# Patient Record
Sex: MALE | Race: WHITE | Employment: OTHER | URBAN - METROPOLITAN AREA
[De-identification: names, ages, dates, MRNs, and addresses within clinical notes are randomized per-mention and may not be internally consistent; named-entity substitution may affect disease eponyms.]

---

## 2020-02-06 ENCOUNTER — HOSPITAL ENCOUNTER (OUTPATIENT)
Dept: ULTRASOUND IMAGING | Age: 85
Discharge: HOME | End: 2020-02-06
Payer: MEDICARE

## 2020-02-06 PROCEDURE — 76870 US EXAM SCROTUM: CPT

## 2021-12-15 ENCOUNTER — APPOINTMENT (OUTPATIENT)
Dept: CT IMAGING | Age: 86
End: 2021-12-15
Payer: MEDICARE

## 2021-12-15 ENCOUNTER — HOSPITAL ENCOUNTER (EMERGENCY)
Age: 86
Discharge: HOME | End: 2021-12-15
Attending: EMERGENCY MEDICINE
Payer: MEDICARE

## 2021-12-15 VITALS
TEMPERATURE: 97.9 F | HEART RATE: 91 BPM | HEIGHT: 66 IN | DIASTOLIC BLOOD PRESSURE: 72 MMHG | BODY MASS INDEX: 30.53 KG/M2 | SYSTOLIC BLOOD PRESSURE: 150 MMHG | WEIGHT: 190 LBS | RESPIRATION RATE: 20 BRPM | OXYGEN SATURATION: 97 %

## 2021-12-15 DIAGNOSIS — N43.3 HYDROCELE, BILATERAL: ICD-10-CM

## 2021-12-15 DIAGNOSIS — N32.3 BLADDER DIVERTICULUM: ICD-10-CM

## 2021-12-15 DIAGNOSIS — K44.9 HIATAL HERNIA: ICD-10-CM

## 2021-12-15 DIAGNOSIS — K40.90 UNILATERAL INGUINAL HERNIA WITHOUT OBSTRUCTION OR GANGRENE, RECURRENCE NOT SPECIFIED: Primary | ICD-10-CM

## 2021-12-15 DIAGNOSIS — K57.90 DIVERTICULOSIS: ICD-10-CM

## 2021-12-15 LAB
ALBUMIN SERPL-MCNC: 4.1 G/DL (ref 3.5–5.2)
ALP BLD-CCNC: 72 U/L (ref 40–129)
ALT SERPL-CCNC: 11 U/L (ref 0–40)
ANION GAP SERPL CALCULATED.3IONS-SCNC: 11 MMOL/L (ref 7–16)
AST SERPL-CCNC: 15 U/L (ref 0–39)
BASOPHILS ABSOLUTE: 0.08 E9/L (ref 0–0.2)
BASOPHILS RELATIVE PERCENT: 1.4 % (ref 0–2)
BILIRUB SERPL-MCNC: 0.8 MG/DL (ref 0–1.2)
BILIRUBIN URINE: NEGATIVE
BLOOD, URINE: NEGATIVE
BUN BLDV-MCNC: 13 MG/DL (ref 6–23)
CALCIUM SERPL-MCNC: 9.1 MG/DL (ref 8.6–10.2)
CHLORIDE BLD-SCNC: 106 MMOL/L (ref 98–107)
CLARITY: CLEAR
CO2: 25 MMOL/L (ref 22–29)
COLOR: YELLOW
CREAT SERPL-MCNC: 1.1 MG/DL (ref 0.7–1.2)
EOSINOPHILS ABSOLUTE: 0.16 E9/L (ref 0.05–0.5)
EOSINOPHILS RELATIVE PERCENT: 2.7 % (ref 0–6)
GFR AFRICAN AMERICAN: >60
GFR NON-AFRICAN AMERICAN: >60 ML/MIN/1.73
GLUCOSE BLD-MCNC: 102 MG/DL (ref 74–99)
GLUCOSE URINE: NEGATIVE MG/DL
HCT VFR BLD CALC: 40.1 % (ref 37–54)
HEMOGLOBIN: 13.3 G/DL (ref 12.5–16.5)
IMMATURE GRANULOCYTES #: 0.04 E9/L
IMMATURE GRANULOCYTES %: 0.7 % (ref 0–5)
KETONES, URINE: NEGATIVE MG/DL
LACTIC ACID, SEPSIS: 0.8 MMOL/L (ref 0.5–1.9)
LEUKOCYTE ESTERASE, URINE: NEGATIVE
LYMPHOCYTES ABSOLUTE: 1.41 E9/L (ref 1.5–4)
LYMPHOCYTES RELATIVE PERCENT: 24 % (ref 20–42)
MAGNESIUM: 2 MG/DL (ref 1.6–2.6)
MCH RBC QN AUTO: 31.8 PG (ref 26–35)
MCHC RBC AUTO-ENTMCNC: 33.2 % (ref 32–34.5)
MCV RBC AUTO: 95.9 FL (ref 80–99.9)
MONOCYTES ABSOLUTE: 0.56 E9/L (ref 0.1–0.95)
MONOCYTES RELATIVE PERCENT: 9.5 % (ref 2–12)
NEUTROPHILS ABSOLUTE: 3.63 E9/L (ref 1.8–7.3)
NEUTROPHILS RELATIVE PERCENT: 61.7 % (ref 43–80)
NITRITE, URINE: NEGATIVE
PDW BLD-RTO: 12.4 FL (ref 11.5–15)
PH UA: 7 (ref 5–9)
PLATELET # BLD: 219 E9/L (ref 130–450)
PMV BLD AUTO: 9.6 FL (ref 7–12)
POTASSIUM SERPL-SCNC: 4.8 MMOL/L (ref 3.5–5)
PROTEIN UA: NEGATIVE MG/DL
RBC # BLD: 4.18 E12/L (ref 3.8–5.8)
SODIUM BLD-SCNC: 142 MMOL/L (ref 132–146)
SPECIFIC GRAVITY UA: 1.02 (ref 1–1.03)
TOTAL PROTEIN: 6.7 G/DL (ref 6.4–8.3)
UROBILINOGEN, URINE: 0.2 E.U./DL
WBC # BLD: 5.9 E9/L (ref 4.5–11.5)

## 2021-12-15 PROCEDURE — 81003 URINALYSIS AUTO W/O SCOPE: CPT

## 2021-12-15 PROCEDURE — 83735 ASSAY OF MAGNESIUM: CPT

## 2021-12-15 PROCEDURE — 85025 COMPLETE CBC W/AUTO DIFF WBC: CPT

## 2021-12-15 PROCEDURE — 6360000004 HC RX CONTRAST MEDICATION: Performed by: RADIOLOGY

## 2021-12-15 PROCEDURE — 80053 COMPREHEN METABOLIC PANEL: CPT

## 2021-12-15 PROCEDURE — 6370000000 HC RX 637 (ALT 250 FOR IP): Performed by: STUDENT IN AN ORGANIZED HEALTH CARE EDUCATION/TRAINING PROGRAM

## 2021-12-15 PROCEDURE — 74177 CT ABD & PELVIS W/CONTRAST: CPT

## 2021-12-15 PROCEDURE — 83605 ASSAY OF LACTIC ACID: CPT

## 2021-12-15 PROCEDURE — 99284 EMERGENCY DEPT VISIT MOD MDM: CPT

## 2021-12-15 RX ORDER — HYDROCODONE BITARTRATE AND ACETAMINOPHEN 5; 325 MG/1; MG/1
1 TABLET ORAL EVERY 8 HOURS PRN
Qty: 9 TABLET | Refills: 0 | Status: SHIPPED | OUTPATIENT
Start: 2021-12-15 | End: 2021-12-18

## 2021-12-15 RX ORDER — MAGNESIUM SULFATE IN WATER 40 MG/ML
2000 INJECTION, SOLUTION INTRAVENOUS ONCE
Status: DISCONTINUED | OUTPATIENT
Start: 2021-12-15 | End: 2021-12-15

## 2021-12-15 RX ORDER — M-VIT,TX,IRON,MINS/CALC/FOLIC 27MG-0.4MG
1 TABLET ORAL DAILY
COMMUNITY

## 2021-12-15 RX ORDER — ASPIRIN 81 MG/1
81 TABLET, CHEWABLE ORAL DAILY
COMMUNITY
End: 2022-04-11 | Stop reason: SINTOL

## 2021-12-15 RX ORDER — HYDROCODONE BITARTRATE AND ACETAMINOPHEN 5; 325 MG/1; MG/1
1 TABLET ORAL ONCE
Status: COMPLETED | OUTPATIENT
Start: 2021-12-15 | End: 2021-12-15

## 2021-12-15 RX ORDER — MORPHINE SULFATE 4 MG/ML
4 INJECTION, SOLUTION INTRAMUSCULAR; INTRAVENOUS ONCE
Status: DISCONTINUED | OUTPATIENT
Start: 2021-12-15 | End: 2021-12-15

## 2021-12-15 RX ORDER — TAMSULOSIN HYDROCHLORIDE 0.4 MG/1
0.4 CAPSULE ORAL NIGHTLY
COMMUNITY
End: 2022-06-15

## 2021-12-15 RX ADMIN — IOPAMIDOL 75 ML: 755 INJECTION, SOLUTION INTRAVENOUS at 15:38

## 2021-12-15 RX ADMIN — HYDROCODONE BITARTRATE AND ACETAMINOPHEN 1 TABLET: 5; 325 TABLET ORAL at 16:53

## 2021-12-15 ASSESSMENT — ENCOUNTER SYMPTOMS
SHORTNESS OF BREATH: 0
BACK PAIN: 0
SORE THROAT: 0
ABDOMINAL PAIN: 1
DIARRHEA: 0
VOMITING: 0
BLOOD IN STOOL: 0
ABDOMINAL DISTENTION: 1
RHINORRHEA: 0
NAUSEA: 0
COUGH: 0

## 2021-12-15 ASSESSMENT — PAIN SCALES - GENERAL: PAINLEVEL_OUTOF10: 5

## 2021-12-15 ASSESSMENT — PAIN DESCRIPTION - LOCATION: LOCATION: ABDOMEN;GROIN

## 2021-12-15 NOTE — ED PROVIDER NOTES
[address] [facility]  Department of Emergency Medicine     Written by: Iesha Patricia DO  Patient Name: Romana Ficks  Attending Provider: Ava Flanagan MD  Admit Date: 12/15/2021 11:36 AM  MRN: [id number]                   : 1935        Chief Complaint   Patient presents with    Abdominal Pain     scrotal swelling-onset 2 weeks-denies urinary discomfort    - Chief complaint    HPI   Romana Ficks is a 81 y.o. male presenting to the ED for evaluation of Abdominal Pain (scrotal swelling-onset 2 weeks-denies urinary discomfort)    Patient is being evaluated for left lower quadrant/left lower groin pain and swelling in this region as well as swelling of his left testicle; patient's complaints are severe in severity, waxing and waning, improved after laying flat overnight and sleeping, and start to gradually worsen throughout the day as he gets moving. Patient denies any history of hernia. He does endorse a history of BPH and states that he used to follow with urology for this but has not had any issues in a long time; he currently denies any urinary problems whatsoever, denies any dysuria, frequency, difficulty with urination, or hematuria. Patient denies any nausea or vomiting, he has not had any diarrhea, black or bloody stools. Patient has not had any recent illnesses, fevers, abnormal rashes anywhere, cough, chest pain or palpitations or shortness of breath. Review of Systems   Constitutional: Negative for chills and fever. HENT: Negative for rhinorrhea and sore throat. Eyes: Negative for visual disturbance. Respiratory: Negative for cough and shortness of breath. Cardiovascular: Negative for chest pain and palpitations. Gastrointestinal: Positive for abdominal distention (LLQ swelling) and abdominal pain (LLQ). Negative for blood in stool, diarrhea, nausea and vomiting. Endocrine: Negative for polydipsia and polyuria.    Genitourinary: Positive for scrotal swelling (left) and testicular pain (left). Negative for decreased urine volume, difficulty urinating, dysuria, flank pain, frequency, hematuria, penile discharge, penile pain and penile swelling. Musculoskeletal: Negative for back pain and myalgias. Skin: Negative for rash and wound. Neurological: Negative for weakness and headaches. Psychiatric/Behavioral: Negative for confusion. All other systems reviewed and are negative. Physical Exam  Vitals and nursing note reviewed. Constitutional:       General: He is not in acute distress. Appearance: He is not toxic-appearing. HENT:      Head: Normocephalic and atraumatic. Right Ear: External ear normal.      Left Ear: External ear normal.      Nose: Nose normal. No rhinorrhea. Mouth/Throat:      Mouth: Mucous membranes are moist.      Pharynx: Oropharynx is clear. Eyes:      Extraocular Movements: Extraocular movements intact. Conjunctiva/sclera: Conjunctivae normal.      Pupils: Pupils are equal, round, and reactive to light. Cardiovascular:      Rate and Rhythm: Normal rate and regular rhythm. Pulses: Normal pulses. Heart sounds: Normal heart sounds. Pulmonary:      Effort: Pulmonary effort is normal. No respiratory distress. Breath sounds: Normal breath sounds. No wheezing or rales. Abdominal:      General: Bowel sounds are normal. There is no distension. Palpations: Abdomen is soft. Tenderness: There is abdominal tenderness (swelling and tenderness in left groin region). There is no right CVA tenderness, left CVA tenderness, guarding or rebound. Hernia: A hernia is present. Hernia is present in the left inguinal area.       Comments: No signs or exam findings to suggest Caroline's or infectious process   Genitourinary:     Penis: Normal.       Comments: Left testicle appears swollen; no redness; normal cremasteric reflex; no crepitus, no wound or rash noted; moderate TTP to this left testicle; subtle bowel sounds heard when auscultating left testicular region and left groin  Musculoskeletal:         General: No tenderness. Normal range of motion. Cervical back: Normal range of motion and neck supple. Right lower leg: No edema. Left lower leg: No edema. Skin:     General: Skin is warm and dry. Capillary Refill: Capillary refill takes less than 2 seconds. Coloration: Skin is not jaundiced or pale. Findings: No rash. Neurological:      General: No focal deficit present. Mental Status: He is alert and oriented to person, place, and time. Psychiatric:         Mood and Affect: Mood normal.         Behavior: Behavior normal.          Procedures       [facility]     ED Course as of 12/15/21 1644   Wed Dec 15, 2021   1618 CT ABDOMEN PELVIS W IV CONTRAST Additional Contrast? None  IMPRESSION:  1. Left inguinal hernia contains fat and sigmoid colon without strangulation  or obstruction. 2. Bilateral hydroceles within the scrotum. 3. Colonic diverticulosis without evidence of diverticulitis. 4. Left nonobstructive nephrolithiasis. 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 6. Large hiatal hernia. 7. 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 [VG]   1643 Patient does not want any IV morphine but is amenable to a dose of p.o. Norco.  Orders placed. He has to use the bathroom but I did let him know that we are going to attempt to reduce his hernia, he voiced understanding and is amenable to this plan. We will attempt reduction after he is finished using the restroom. [VG]      ED Course User Index  [VG] Prince Crisostomo DO       [facility]    This is a 81 y.o. male presenting for evaluation of left lower groin pain, swelling, and swelling/pain in his left testicle; denies history of hernia. On arrival patient is alert, oriented, appears mildly uncomfortable due to symptoms; he is not toxic in appearance.   Vitals are stable. Exam significant for swelling/distention of his left lower quadrant and left testicle; there is no significant redness, no palpable crepitus, no wounds and no signs or exam findings that would be concerning for cellulitis or Caroline's; there is no penile pain or penile discharge on exam; normal cremasteric reflex. Suspect inguinal hernia on examination. CT abdomen pelvis performed which shows left inguinal hernia which contains fat and sigmoid colon without strangulation or obstruction; this correlates with lab values which showed normal lactic, no leukocytosis, and overall generally unremarkable lab values; CT also shows incidental findings which were discussed with patient. Patient was given p.o. Norco with improvement of symptoms, placed in Trendelenburg position and ice packs were applied to the affected region; slow attempt at reduction was made with success although after pressure is removed it does appear that the hernia comes back out but does not come back down to the testicle. Patient states his symptoms are improved. Given his improvement of symptoms, reassuring exam findings, reassuring labs and imaging studies, feel patient is appropriate for discharge with instruction for outpatient follow-up. He was referred to general surgery and was also referred to follow-up with urology given the finding of bilateral hydrocele on his CT study. Patient voiced understanding is amenable to this plan. Strict return precautions were discussed. He was provided short prescription of Garrett for symptoms as needed. I have discussed this patient with my attending, who has seen the patient and agrees with this disposition.     Patient was seen and evaluated by myself and my attending Micha Keller MD. Assessment and Plan discussed with attending provider, please see attestation for final plan of care.       --------------------------------------------- PAST HISTORY ---------------------------------------------  Past Medical History:  has a past medical history of BPH (benign prostatic hyperplasia). Past Surgical History:  has no past surgical history on file. Social History:  reports that he has never smoked. He does not have any smokeless tobacco history on file. He reports current alcohol use. Family History: family history is not on file. The patients home medications have been reviewed. Allergies: Patient has no known allergies.     -------------------------------------------------- RESULTS -------------------------------------------------  Labs:  Results for orders placed or performed during the hospital encounter of 12/15/21   CBC Auto Differential   Result Value Ref Range    WBC 5.9 4.5 - 11.5 E9/L    RBC 4.18 3.80 - 5.80 E12/L    Hemoglobin 13.3 12.5 - 16.5 g/dL    Hematocrit 40.1 37.0 - 54.0 %    MCV 95.9 80.0 - 99.9 fL    MCH 31.8 26.0 - 35.0 pg    MCHC 33.2 32.0 - 34.5 %    RDW 12.4 11.5 - 15.0 fL    Platelets 376 154 - 219 E9/L    MPV 9.6 7.0 - 12.0 fL    Neutrophils % 61.7 43.0 - 80.0 %    Immature Granulocytes % 0.7 0.0 - 5.0 %    Lymphocytes % 24.0 20.0 - 42.0 %    Monocytes % 9.5 2.0 - 12.0 %    Eosinophils % 2.7 0.0 - 6.0 %    Basophils % 1.4 0.0 - 2.0 %    Neutrophils Absolute 3.63 1.80 - 7.30 E9/L    Immature Granulocytes # 0.04 E9/L    Lymphocytes Absolute 1.41 (L) 1.50 - 4.00 E9/L    Monocytes Absolute 0.56 0.10 - 0.95 E9/L    Eosinophils Absolute 0.16 0.05 - 0.50 E9/L    Basophils Absolute 0.08 0.00 - 0.20 E9/L   Comprehensive metabolic panel   Result Value Ref Range    Sodium 142 132 - 146 mmol/L    Potassium 4.8 3.5 - 5.0 mmol/L    Chloride 106 98 - 107 mmol/L    CO2 25 22 - 29 mmol/L    Anion Gap 11 7 - 16 mmol/L    Glucose 102 (H) 74 - 99 mg/dL    BUN 13 6 - 23 mg/dL    CREATININE 1.1 0.7 - 1.2 mg/dL    GFR Non-African American >60 >=60 mL/min/1.73    GFR African American >60     Calcium 9.1 8.6 - 10.2 mg/dL    Total Protein 6.7 6.4 - 8.3 g/dL    Albumin 4.1 3.5 - 5.2 g/dL    Total Bilirubin 0.8 0.0 - 1.2 mg/dL    Alkaline Phosphatase 72 40 - 129 U/L    ALT 11 0 - 40 U/L    AST 15 0 - 39 U/L   Urinalysis, reflex to microscopic   Result Value Ref Range    Color, UA Yellow Straw/Yellow    Clarity, UA Clear Clear    Glucose, Ur Negative Negative mg/dL    Bilirubin Urine Negative Negative    Ketones, Urine Negative Negative mg/dL    Specific Gravity, UA 1.020 1.005 - 1.030    Blood, Urine Negative Negative    pH, UA 7.0 5.0 - 9.0    Protein, UA Negative Negative mg/dL    Urobilinogen, Urine 0.2 <2.0 E.U./dL    Nitrite, Urine Negative Negative    Leukocyte Esterase, Urine Negative Negative   Lactate, Sepsis   Result Value Ref Range    Lactic Acid, Sepsis 0.8 0.5 - 1.9 mmol/L   Magnesium   Result Value Ref Range    Magnesium 2.0 1.6 - 2.6 mg/dL       Radiology:  CT ABDOMEN PELVIS W IV CONTRAST Additional Contrast? None   Final Result   1. Left inguinal hernia contains fat and sigmoid colon without strangulation   or obstruction. 2. Bilateral hydroceles within the scrotum. 3. Colonic diverticulosis without evidence of diverticulitis. 4. Left nonobstructive nephrolithiasis. 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 6. Large hiatal hernia. 7. 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 RECOMMENDATIONS:   Unavailable                   ------------------------- NURSING NOTES AND VITALS REVIEWED ---------------------------  Date / Time Roomed:  12/15/2021 11:36 AM  ED Bed Assignment:  20/20    The nursing notes within the ED encounter and vital signs as below have been reviewed.    BP (!) 143/68   Pulse 84   Temp 97.5 °F (36.4 °C) (Temporal)   Resp 18   Ht 5' 6\" (1.676 m)   Wt 190 lb (86.2 kg)   SpO2 98%   BMI 30.67 kg/m²   Oxygen Saturation Interpretation: Normal      ------------------------------------------ PROGRESS NOTES ------------------------------------------  4:49 PM EST  I have spoken with the patient and discussed todays results, in addition to providing specific details for the plan of care and counseling regarding the diagnosis and prognosis. Their questions are answered at this time and they are agreeable with the plan. I discussed at length with them reasons for immediate return here for re evaluation. They will followup with their primary care physician , urologist, and referred general surgeon by calling their office tomorrow. --------------------------------- ADDITIONAL PROVIDER NOTES ---------------------------------  At this time the patient is without objective evidence of an acute process requiring hospitalization or inpatient management. They have remained hemodynamically stable throughout their entire ED visit and are stable for discharge with outpatient follow-up. The plan has been discussed in detail and they are aware of the specific conditions for emergent return, as well as the importance of follow-up. New Prescriptions    HYDROCODONE-ACETAMINOPHEN (NORCO) 5-325 MG PER TABLET    Take 1 tablet by mouth every 8 hours as needed for Pain for up to 3 days. Intended supply: 3 days. Take lowest dose possible to manage pain       Diagnosis:  1. Unilateral inguinal hernia without obstruction or gangrene, recurrence not specified    2. Hydrocele, bilateral    3. Bladder diverticulum    4. Diverticulosis        Disposition:  Patient's disposition: Discharge to home  Patient's condition is stable.          Radhames Bachelor, DO  Resident  12/15/21 [id number]

## 2021-12-15 NOTE — TELEPHONE ENCOUNTER
Pc from pt finesse complaining of having discomfort and swelling testicles. Informed pt to go to ER. Pt was agreeable.

## 2021-12-17 ENCOUNTER — TELEPHONE (OUTPATIENT)
Dept: PRIMARY CARE CLINIC | Age: 86
End: 2021-12-17

## 2021-12-17 NOTE — TELEPHONE ENCOUNTER
Spoke to patient. He is going to take 2 more senna tonight, and if that doesn't work will get Mag. Citrate. Follow up with Dr. Francisco Javier Diaz on Monday for hernia.

## 2021-12-17 NOTE — TELEPHONE ENCOUNTER
----- Message from Yissel Zimmerman sent at 12/17/2021 11:21 AM EST -----  Subject: Message to Provider    QUESTIONS  Information for Provider? Wednesday patient went to the ED for extreme   pain, DX lower hernia, hasn't had a bowel movement since Tuesday. Would   like to know what you might recommend. He took 2 Senokot and still hasn't   had a bowel movement.   ---------------------------------------------------------------------------  --------------  CALL BACK INFO  What is the best way for the office to contact you? OK to leave message on   voicemail  Preferred Call Back Phone Number?  [phone]  ---------------------------------------------------------------------------  --------------  SCRIPT ANSWERS  undefined

## 2021-12-20 ENCOUNTER — OFFICE VISIT (OUTPATIENT)
Dept: SURGERY | Age: 86
End: 2021-12-20
Payer: MEDICARE

## 2021-12-20 VITALS
SYSTOLIC BLOOD PRESSURE: 144 MMHG | HEIGHT: 66 IN | TEMPERATURE: 97.7 F | WEIGHT: 190 LBS | HEART RATE: 79 BPM | DIASTOLIC BLOOD PRESSURE: 82 MMHG | OXYGEN SATURATION: 96 % | RESPIRATION RATE: 18 BRPM | BODY MASS INDEX: 30.53 KG/M2

## 2021-12-20 DIAGNOSIS — K40.90 UNILATERAL INGUINAL HERNIA WITHOUT OBSTRUCTION OR GANGRENE, RECURRENCE NOT SPECIFIED: Primary | ICD-10-CM

## 2021-12-20 PROCEDURE — 4040F PNEUMOC VAC/ADMIN/RCVD: CPT | Performed by: SURGERY

## 2021-12-20 PROCEDURE — 1036F TOBACCO NON-USER: CPT | Performed by: SURGERY

## 2021-12-20 PROCEDURE — 1123F ACP DISCUSS/DSCN MKR DOCD: CPT | Performed by: SURGERY

## 2021-12-20 PROCEDURE — G8427 DOCREV CUR MEDS BY ELIG CLIN: HCPCS | Performed by: SURGERY

## 2021-12-20 PROCEDURE — G8417 CALC BMI ABV UP PARAM F/U: HCPCS | Performed by: SURGERY

## 2021-12-20 PROCEDURE — G8484 FLU IMMUNIZE NO ADMIN: HCPCS | Performed by: SURGERY

## 2021-12-20 PROCEDURE — 99204 OFFICE O/P NEW MOD 45 MIN: CPT | Performed by: SURGERY

## 2021-12-20 NOTE — PROGRESS NOTES
General Surgery History and Physical    Patient's Name/Date of Birth: Wayne Humphreys / 1935    Date: December 20, 2021     Surgeon: Steve Kincaid M.D.    PCP: Rosa Allan DO     Chief Complaint: left  inguinal hernia    HPI:   Wayne Humphreys is a 81 y.o. male who presents for evaluation of left inguinal hernia this reducible but has become enlarging and causing more pain. Is tolerating a diet and moving bowels. Past Medical History:   Diagnosis Date    BPH (benign prostatic hyperplasia)        History reviewed. No pertinent surgical history. Current Outpatient Medications   Medication Sig Dispense Refill    tamsulosin (FLOMAX) 0.4 MG capsule Take 0.4 mg by mouth daily      aspirin 81 MG chewable tablet Take 81 mg by mouth daily      Multiple Vitamins-Minerals (THERAPEUTIC MULTIVITAMIN-MINERALS) tablet Take 1 tablet by mouth daily       No current facility-administered medications for this visit. No Known Allergies    The patient has a family history that is negative for severe cardiovascular or respiratory issues, negative for reaction to anesthesia.     Social History     Socioeconomic History    Marital status:      Spouse name: Not on file    Number of children: Not on file    Years of education: Not on file    Highest education level: Not on file   Occupational History    Not on file   Tobacco Use    Smoking status: Never Smoker    Smokeless tobacco: Never Used   Vaping Use    Vaping Use: Never used   Substance and Sexual Activity    Alcohol use: Yes     Comment: social    Drug use: Not on file    Sexual activity: Not on file   Other Topics Concern    Not on file   Social History Narrative    Not on file     Social Determinants of Health     Financial Resource Strain:     Difficulty of Paying Living Expenses: Not on file   Food Insecurity:     Worried About Running Out of Food in the Last Year: Not on file    Preethi of Food in the Last Year: Not on file Transportation Needs:     Lack of Transportation (Medical): Not on file    Lack of Transportation (Non-Medical):  Not on file   Physical Activity:     Days of Exercise per Week: Not on file    Minutes of Exercise per Session: Not on file   Stress:     Feeling of Stress : Not on file   Social Connections:     Frequency of Communication with Friends and Family: Not on file    Frequency of Social Gatherings with Friends and Family: Not on file    Attends [employer] Services: Not on file    Active Member of [address] [place] [address] or Organizations: Not on file    Attends Club or Organization Meetings: Not on file    Marital Status: Not on file   Intimate Partner Violence:     Fear of Current or Ex-Partner: Not on file    Emotionally Abused: Not on file    Physically Abused: Not on file    Sexually Abused: Not on file   Housing Stability:     Unable to Pay for Housing in the Last Year: Not on file    Number of [place] in the Last Year: Not on file    Unstable Housing in the Last Year: Not on file           Review of Systems  Review of Systems -  General ROS: negative for - chills, fatigue or malaise  ENT ROS: negative for - hearing change, nasal congestion or nasal discharge  Allergy and Immunology ROS: negative for - hives, itchy/watery eyes or nasal congestion  Hematological and Lymphatic ROS: negative for - blood clots, blood transfusions, bruising or fatigue  Endocrine ROS: negative for - malaise/lethargy, mood swings, palpitations or polydipsia/polyuria  Breast ROS: negative for - new or changing breast lumps or nipple changes  Respiratory ROS: negative for - sputum changes, stridor, tachypnea or wheezing  Cardiovascular ROS: negative for - irregular heartbeat, loss of consciousness, murmur or orthopnea  Gastrointestinal ROS: negative for - constipation, diarrhea, gas/bloating, heartburn or hematemesis  Genito-Urinary ROS: negative for -  genital discharge, genital ulcers or hematuria  Musculoskeletal ROS: negative for - gait disturbance, muscle pain or muscular weakness    Physical exam:   BP (!) 144/82 (Site: Left Upper Arm, Position: Sitting, Cuff Size: Medium Adult)   Pulse 79   Temp 97.7 °F (36.5 °C) (Temporal)   Resp 18   Ht 5' 6\" (1.676 m)   Wt 190 lb (86.2 kg)   SpO2 96%   BMI 30.67 kg/m²   General appearance:  NAD  Pyscho/social: negative for tremors and hallucinations  Head: NCAT, PERRLA, EOMI, red conjunctiva  Neck: supple, no masses  Lungs: CTAB, equal chest rise bilateral  Heart: Reg rate  Abdomen: soft, minimally tender, nondistended, left reducible hernia   Skin; no lesions  Gu: no cva tenderness  Extremities: extremities normal, atraumatic, no cyanosis or edema    Assessment:  81 y.o. male with left inguinal hernia     Plan: To OR   Discussed the risk, benefits and alternatives of surgery including wound infections, bleeding, scar and hernia formation and the risks of general anesthetic including MI, CVA, sudden death or reactions to anesthetic medications. The patient understands the risks and alternatives and the possibility of converting to an open procedure. All questions were answered to the patient's satisfaction and they freely signed the consent.         Madeline Caldera MD  2:20 PM  12/20/2021

## 2022-01-18 ENCOUNTER — NURSE ONLY (OUTPATIENT)
Dept: PRIMARY CARE CLINIC | Age: 87
End: 2022-01-18
Payer: MEDICARE

## 2022-01-18 ENCOUNTER — TELEPHONE (OUTPATIENT)
Dept: PRIMARY CARE CLINIC | Age: 87
End: 2022-01-18

## 2022-01-18 DIAGNOSIS — N39.0 URINARY TRACT INFECTION WITHOUT HEMATURIA, SITE UNSPECIFIED: Primary | ICD-10-CM

## 2022-01-18 DIAGNOSIS — N39.0 URINARY TRACT INFECTION WITHOUT HEMATURIA, SITE UNSPECIFIED: ICD-10-CM

## 2022-01-18 LAB
BILIRUBIN, POC: NORMAL
BLOOD URINE, POC: NORMAL
CLARITY, POC: CLEAR
COLOR, POC: YELLOW
GLUCOSE URINE, POC: NORMAL
KETONES, POC: NORMAL
LEUKOCYTE EST, POC: NORMAL
NITRITE, POC: NORMAL
PH, POC: 6.5
PROTEIN, POC: NORMAL
SPECIFIC GRAVITY, POC: 1.02
UROBILINOGEN, POC: 0.2

## 2022-01-18 PROCEDURE — 99999 PR OFFICE/OUTPT VISIT,PROCEDURE ONLY: CPT | Performed by: FAMILY MEDICINE

## 2022-01-18 PROCEDURE — 81002 URINALYSIS NONAUTO W/O SCOPE: CPT | Performed by: FAMILY MEDICINE

## 2022-01-18 RX ORDER — CIPROFLOXACIN 500 MG/1
500 TABLET, FILM COATED ORAL 2 TIMES DAILY
Qty: 20 TABLET | Refills: 0 | Status: SHIPPED
Start: 2022-01-18 | End: 2022-01-28

## 2022-01-18 NOTE — TELEPHONE ENCOUNTER
Pt called in because he thinks he has some type of  urinary infections he expresses that it burn when he tries to urinate and also that he has increased urinary sensation but not able to produce much.  Pt wants to know if he can bring up a UA and have it tested so he can get something called in

## 2022-01-20 ENCOUNTER — TELEPHONE (OUTPATIENT)
Dept: SURGERY | Age: 87
End: 2022-01-20

## 2022-01-20 NOTE — TELEPHONE ENCOUNTER
Patient called and wants to schedule inguinal hernia repair. I advised patient that Danika is at the other office and I will give her a message to reach out to him to schedule. He gave me a verbal understanding.

## 2022-01-24 ENCOUNTER — TELEPHONE (OUTPATIENT)
Dept: SURGERY | Age: 87
End: 2022-01-24

## 2022-01-24 ENCOUNTER — PREP FOR PROCEDURE (OUTPATIENT)
Dept: SURGERY | Age: 87
End: 2022-01-24

## 2022-01-24 RX ORDER — SODIUM CHLORIDE 0.9 % (FLUSH) 0.9 %
5-40 SYRINGE (ML) INJECTION EVERY 12 HOURS SCHEDULED
Status: CANCELLED | OUTPATIENT
Start: 2022-01-24

## 2022-01-24 RX ORDER — SODIUM CHLORIDE, SODIUM LACTATE, POTASSIUM CHLORIDE, CALCIUM CHLORIDE 600; 310; 30; 20 MG/100ML; MG/100ML; MG/100ML; MG/100ML
INJECTION, SOLUTION INTRAVENOUS CONTINUOUS
Status: CANCELLED | OUTPATIENT
Start: 2022-01-24

## 2022-01-24 RX ORDER — SODIUM CHLORIDE 9 MG/ML
25 INJECTION, SOLUTION INTRAVENOUS PRN
Status: CANCELLED | OUTPATIENT
Start: 2022-01-24

## 2022-01-24 RX ORDER — SODIUM CHLORIDE 0.9 % (FLUSH) 0.9 %
5-40 SYRINGE (ML) INJECTION PRN
Status: CANCELLED | OUTPATIENT
Start: 2022-01-24

## 2022-01-24 NOTE — TELEPHONE ENCOUNTER
Patient provided with surgery instructions during office visit. Patient scheduled for follow up visit with Dr. Rachel Keller on 02/14/2022. Surgery scheduling form faxed and confirmation received.     Electronically signed by Chidi Camacho [address] [place] [address] Chen Turner on 1/24/2022 at 9:50 AM

## 2022-01-25 ENCOUNTER — OFFICE VISIT (OUTPATIENT)
Dept: PRIMARY CARE CLINIC | Age: 87
End: 2022-01-25
Payer: MEDICARE

## 2022-01-25 VITALS
HEART RATE: 78 BPM | SYSTOLIC BLOOD PRESSURE: 136 MMHG | TEMPERATURE: 97.2 F | HEIGHT: 66 IN | BODY MASS INDEX: 30.67 KG/M2 | WEIGHT: 190.8 LBS | OXYGEN SATURATION: 95 % | DIASTOLIC BLOOD PRESSURE: 86 MMHG

## 2022-01-25 DIAGNOSIS — Z01.818 PRE-OP EXAM: Primary | ICD-10-CM

## 2022-01-25 PROCEDURE — 1123F ACP DISCUSS/DSCN MKR DOCD: CPT | Performed by: FAMILY MEDICINE

## 2022-01-25 PROCEDURE — 1036F TOBACCO NON-USER: CPT | Performed by: FAMILY MEDICINE

## 2022-01-25 PROCEDURE — G8484 FLU IMMUNIZE NO ADMIN: HCPCS | Performed by: FAMILY MEDICINE

## 2022-01-25 PROCEDURE — G8427 DOCREV CUR MEDS BY ELIG CLIN: HCPCS | Performed by: FAMILY MEDICINE

## 2022-01-25 PROCEDURE — G8417 CALC BMI ABV UP PARAM F/U: HCPCS | Performed by: FAMILY MEDICINE

## 2022-01-25 PROCEDURE — 4040F PNEUMOC VAC/ADMIN/RCVD: CPT | Performed by: FAMILY MEDICINE

## 2022-01-25 PROCEDURE — 99213 OFFICE O/P EST LOW 20 MIN: CPT | Performed by: FAMILY MEDICINE

## 2022-01-25 SDOH — ECONOMIC STABILITY: FOOD INSECURITY: WITHIN THE PAST 12 MONTHS, YOU WORRIED THAT YOUR FOOD WOULD RUN OUT BEFORE YOU GOT MONEY TO BUY MORE.: NEVER TRUE

## 2022-01-25 SDOH — ECONOMIC STABILITY: FOOD INSECURITY: WITHIN THE PAST 12 MONTHS, THE FOOD YOU BOUGHT JUST DIDN'T LAST AND YOU DIDN'T HAVE MONEY TO GET MORE.: NEVER TRUE

## 2022-01-25 ASSESSMENT — PATIENT HEALTH QUESTIONNAIRE - PHQ9
2. FEELING DOWN, DEPRESSED OR HOPELESS: 0
SUM OF ALL RESPONSES TO PHQ QUESTIONS 1-9: 0
1. LITTLE INTEREST OR PLEASURE IN DOING THINGS: 0
SUM OF ALL RESPONSES TO PHQ QUESTIONS 1-9: 0
SUM OF ALL RESPONSES TO PHQ9 QUESTIONS 1 & 2: 0

## 2022-01-25 ASSESSMENT — SOCIAL DETERMINANTS OF HEALTH (SDOH): HOW HARD IS IT FOR YOU TO PAY FOR THE VERY BASICS LIKE FOOD, HOUSING, MEDICAL CARE, AND HEATING?: NOT HARD AT ALL

## 2022-01-25 NOTE — PROGRESS NOTES
Subjective:  81 y.o. male who presents to the office today with chief complaint:  Chief Complaint   Patient presents with    Pre-op Exam     Pre-op clearance: To have a lap L inguinal hernia repair with Dr. Nicole Johns on 2/1/22. Will have EKG completed on 1/28/22. Denies recent illness, chest pain, dyspnea on exertion. Has tolerated anaesthesia well in the past- prostatectomy, bunion removal in the past.     Health Maintenance Due   Topic Date Due    Depression Screen  Never done    Shingles Vaccine (1 of 2) Never done    Pneumococcal 65+ years Vaccine (1 of 1 - PPSV23) Never done   Coretta Current Annual Wellness Visit (AWV)  Never done       Cancer History:  Family Cancer History:    Review of Systems    Review of Systems: All bolded are positive, all others are negative. General:  Fever, chills, diaphoresis, fatigue, malaise, night sweats, weight loss  Psychological:  Anxiety, disorientation, hallucinations. ENT:  Epistaxis, headaches, vertigo, visual changes. Cardiovascular:  Chest pain, irregular heartbeats, palpitations, paroxysmal nocturnal dyspnea. Respiratory:  Shortness of breath, coughing, sputum production, hemoptysis, wheezing, orthopnea. Gastrointestinal:  Nausea, vomiting, diarrhea, heartburn, constipation, abdominal pain, hematemesis, hematochezia, melena, acholic stools  Genito-Urinary:  Dysuria, urgency, frequency, hematuria  Musculoskeletal:  Joint pain, joint stiffness, joint swelling, muscle pain  Neurology:  Headache, focal neurological deficits, weakness, numbness, paresthesia  Derm:  Rashes, ulcers, excoriations, bruising  Extremities:  Decreased ROM, peripheral edema, mottling      Objective:  Vitals:    01/25/22 1239   BP: 136/86   Pulse: 78   Temp: 97.2 °F (36.2 °C)   SpO2: 95%     Physical Exam  Vitals and nursing note reviewed. Constitutional:       General: He is not in acute distress. Appearance: He is well-developed. He is not diaphoretic.    HENT:      Head: Normocephalic and atraumatic. Right Ear: External ear normal.      Left Ear: External ear normal.      Nose: Nose normal.      Mouth/Throat:      Pharynx: No oropharyngeal exudate. Eyes:      General:         Right eye: No discharge. Left eye: No discharge. Conjunctiva/sclera: Conjunctivae normal.      Pupils: Pupils are equal, round, and reactive to light. Cardiovascular:      Rate and Rhythm: Normal rate and regular rhythm. Heart sounds: Normal heart sounds. No murmur heard. No friction rub. No gallop. Pulmonary:      Effort: Pulmonary effort is normal. No respiratory distress. Breath sounds: Normal breath sounds. No wheezing or rales. Abdominal:      General: Bowel sounds are normal. There is no distension. Palpations: Abdomen is soft. Tenderness: There is no abdominal tenderness. There is no guarding or rebound. Comments: Wearing hernia belt   Musculoskeletal:      Cervical back: Normal range of motion and neck supple. Lymphadenopathy:      Cervical: No cervical adenopathy. Neurological:      Mental Status: He is alert and oriented to person, place, and time. Psychiatric:         Behavior: Behavior normal.         Thought Content: Thought content normal.         Judgment: Judgment normal.             Assessment and Plan:  Gabrielle David was seen today for pre-op exam.    Diagnoses and all orders for this visit:    Pre-op exam  -     Cancel: EKG 12 Lead        1. Preop examination: Patient to have laparoscopic left inguinal hernia repair with Dr. Sander Nunez in the future. He is already stopped his aspirin and will do so through the surgery. He will have his EKG completed at the hospital during his preop clearance there. At this time, patient is medically stable for surgical intervention    No follow-ups on file. Patient may come in sooner if needed for medical concerns.      Patient advised to call at any time to cancel, re-schedule, or for any questions/concerns.             Tucker Davison, DO    1/25/22  12:56 PM

## 2022-01-28 ENCOUNTER — HOSPITAL ENCOUNTER (OUTPATIENT)
Dept: PREADMISSION TESTING | Age: 87
Discharge: HOME | End: 2022-01-28
Payer: MEDICARE

## 2022-01-28 VITALS
SYSTOLIC BLOOD PRESSURE: 162 MMHG | HEIGHT: 66 IN | BODY MASS INDEX: 30.86 KG/M2 | HEART RATE: 72 BPM | TEMPERATURE: 97.2 F | DIASTOLIC BLOOD PRESSURE: 78 MMHG | RESPIRATION RATE: 16 BRPM | WEIGHT: 192 LBS | OXYGEN SATURATION: 99 %

## 2022-01-28 DIAGNOSIS — Z01.818 PRE-OP TESTING: Primary | ICD-10-CM

## 2022-01-28 LAB
ANION GAP SERPL CALCULATED.3IONS-SCNC: 8 MMOL/L (ref 7–16)
BUN BLDV-MCNC: 15 MG/DL (ref 6–23)
CALCIUM SERPL-MCNC: 9.2 MG/DL (ref 8.6–10.2)
CHLORIDE BLD-SCNC: 105 MMOL/L (ref 98–107)
CO2: 27 MMOL/L (ref 22–29)
CREAT SERPL-MCNC: 1 MG/DL (ref 0.7–1.2)
EKG ATRIAL RATE: 75 BPM
EKG P AXIS: 62 DEGREES
EKG P-R INTERVAL: 160 MS
EKG Q-T INTERVAL: 386 MS
EKG QRS DURATION: 134 MS
EKG QTC CALCULATION (BAZETT): 431 MS
EKG R AXIS: -10 DEGREES
EKG T AXIS: 66 DEGREES
EKG VENTRICULAR RATE: 75 BPM
GFR AFRICAN AMERICAN: >60
GFR NON-AFRICAN AMERICAN: >60 ML/MIN/1.73
GLUCOSE BLD-MCNC: 105 MG/DL (ref 74–99)
HCT VFR BLD CALC: 39.1 % (ref 37–54)
HEMOGLOBIN: 12.7 G/DL (ref 12.5–16.5)
MCH RBC QN AUTO: 31.6 PG (ref 26–35)
MCHC RBC AUTO-ENTMCNC: 32.5 % (ref 32–34.5)
MCV RBC AUTO: 97.3 FL (ref 80–99.9)
PDW BLD-RTO: 12.7 FL (ref 11.5–15)
PLATELET # BLD: 219 E9/L (ref 130–450)
PMV BLD AUTO: 9.5 FL (ref 7–12)
POTASSIUM REFLEX MAGNESIUM: 4.6 MMOL/L (ref 3.5–5)
RBC # BLD: 4.02 E12/L (ref 3.8–5.8)
SODIUM BLD-SCNC: 140 MMOL/L (ref 132–146)
WBC # BLD: 6.2 E9/L (ref 4.5–11.5)

## 2022-01-28 PROCEDURE — 36415 COLL VENOUS BLD VENIPUNCTURE: CPT

## 2022-01-28 PROCEDURE — 80048 BASIC METABOLIC PNL TOTAL CA: CPT

## 2022-01-28 PROCEDURE — 85027 COMPLETE CBC AUTOMATED: CPT

## 2022-01-28 PROCEDURE — 93005 ELECTROCARDIOGRAM TRACING: CPT | Performed by: ANESTHESIOLOGY

## 2022-01-28 NOTE — PROGRESS NOTES
[facility]                                                                                                                    PRE OP INSTRUCTIONS FOR  Fab Mascorro        Date: 1/28/2022    Date of surgery: 2/1/22   Arrival Time: Hospital will call you between 5pm and 7pm with your final arrival time for surgery    1. Do not eat or drink anything after midnight  prior to surgery. This includes no water, chewing gum, mints or ice chips. 2. Take the following medications with a small sip of water on the morning of Surgery: none     3. Aspirin, Ibuprofen, Advil, Naproxen, Vitamin E and other Anti-inflammatory products should be stopped  before surgery  as directed by your physician. Take Tylenol only unless instructed otherwise by your surgeon. 4. Do not smoke,use illicit drugs and do not drink any alcoholic beverages 24 hours prior to surgery. 5. You may brush your teeth the morning of surgery. DO NOT SWALLOW WATER    6. You MUST make arrangements for a responsible adult to take you home after your surgery. You will not be allowed to leave alone or drive yourself home. It is strongly suggested someone stay with you the first 24 hrs. Your surgery will be cancelled if you do not have a ride home. 7. Please wear simple, loose fitting clothing to the hospital.  Qamar De Jesususing not bring valuables (money, credit cards, checkbooks, etc.) Do not wear any makeup (including no eye makeup) or nail polish on your fingers or toes. 8. DO NOT wear any jewelry or piercings on day of surgery. All body piercing jewelry must be removed. 9. Shower the night before surgery with _x__Antibacterial soap /TJ WIPES________    I  10.  Notify your Surgeon if you develop any illness between now and surgery time, cough, cold, fever, sore throat, nausea, vomiting, etc.  Please notify your surgeon if you experience dizziness, shortness of breath or blurred vision between now & the time of your surgery. 11. If you have ___dentures, they will be removed before going to the OR; we will provide you a container. If you wear ___contact lenses or _x__glasses, they will be removed; please bring a case for them. 12. To provide excellent care visitors will be limited to 1  in the room at any given time. 13. During flu season no children under the age of 15 are permitted in the hospital for the safety of all patients. 14. Other come in main lobby and stop at                  Please call AMBULATORY CARE if you have any further questions.    [address] [facility]     [address]

## 2022-01-28 NOTE — PROGRESS NOTES
Prelim EKG reviewed with Dr. Edward Chapa- no further orders. Call to Dr. Lico Tesfaye office- he will review EKG on Monday 1/31/22.

## 2022-01-31 NOTE — PROGRESS NOTES
Left another message with Dr. Qing Gonzalez office regarding abnormal ekg and need for him to say pt. Is still cleared for surgery. Awaiting return call or fax.

## 2022-02-01 ENCOUNTER — HOSPITAL ENCOUNTER (OUTPATIENT)
Age: 87
Setting detail: OUTPATIENT SURGERY
Discharge: HOME | End: 2022-02-01
Attending: SURGERY | Admitting: SURGERY
Payer: MEDICARE

## 2022-02-01 ENCOUNTER — ANESTHESIA (OUTPATIENT)
Dept: OPERATING ROOM | Age: 87
End: 2022-02-01
Payer: MEDICARE

## 2022-02-01 ENCOUNTER — ANESTHESIA EVENT (OUTPATIENT)
Dept: OPERATING ROOM | Age: 87
End: 2022-02-01
Payer: MEDICARE

## 2022-02-01 VITALS
DIASTOLIC BLOOD PRESSURE: 98 MMHG | OXYGEN SATURATION: 99 % | RESPIRATION RATE: 17 BRPM | SYSTOLIC BLOOD PRESSURE: 146 MMHG

## 2022-02-01 VITALS
DIASTOLIC BLOOD PRESSURE: 72 MMHG | OXYGEN SATURATION: 93 % | TEMPERATURE: 97.3 F | RESPIRATION RATE: 18 BRPM | SYSTOLIC BLOOD PRESSURE: 155 MMHG | HEART RATE: 83 BPM

## 2022-02-01 DIAGNOSIS — G89.18 POST-OP PAIN: Primary | ICD-10-CM

## 2022-02-01 PROCEDURE — 7100000001 HC PACU RECOVERY - ADDTL 15 MIN: Performed by: SURGERY

## 2022-02-01 PROCEDURE — 6370000000 HC RX 637 (ALT 250 FOR IP): Performed by: ANESTHESIOLOGY

## 2022-02-01 PROCEDURE — 2500000003 HC RX 250 WO HCPCS

## 2022-02-01 PROCEDURE — 49650 LAP ING HERNIA REPAIR INIT: CPT | Performed by: SURGERY

## 2022-02-01 PROCEDURE — 2580000003 HC RX 258: Performed by: NURSE ANESTHETIST, CERTIFIED REGISTERED

## 2022-02-01 PROCEDURE — 3600000004 HC SURGERY LEVEL 4 BASE: Performed by: SURGERY

## 2022-02-01 PROCEDURE — 6360000002 HC RX W HCPCS: Performed by: SURGERY

## 2022-02-01 PROCEDURE — 6360000002 HC RX W HCPCS: Performed by: ANESTHESIOLOGY

## 2022-02-01 PROCEDURE — 3700000000 HC ANESTHESIA ATTENDED CARE: Performed by: SURGERY

## 2022-02-01 PROCEDURE — 7100000010 HC PHASE II RECOVERY - FIRST 15 MIN: Performed by: SURGERY

## 2022-02-01 PROCEDURE — 3700000001 HC ADD 15 MINUTES (ANESTHESIA): Performed by: SURGERY

## 2022-02-01 PROCEDURE — C1781 MESH (IMPLANTABLE): HCPCS | Performed by: SURGERY

## 2022-02-01 PROCEDURE — 2580000003 HC RX 258: Performed by: SURGERY

## 2022-02-01 PROCEDURE — 99024 POSTOP FOLLOW-UP VISIT: CPT | Performed by: SURGERY

## 2022-02-01 PROCEDURE — 3600000014 HC SURGERY LEVEL 4 ADDTL 15MIN: Performed by: SURGERY

## 2022-02-01 PROCEDURE — 6360000002 HC RX W HCPCS

## 2022-02-01 PROCEDURE — 2500000003 HC RX 250 WO HCPCS: Performed by: SURGERY

## 2022-02-01 PROCEDURE — 7100000000 HC PACU RECOVERY - FIRST 15 MIN: Performed by: SURGERY

## 2022-02-01 PROCEDURE — 2720000010 HC SURG SUPPLY STERILE: Performed by: SURGERY

## 2022-02-01 PROCEDURE — 2709999900 HC NON-CHARGEABLE SUPPLY: Performed by: SURGERY

## 2022-02-01 PROCEDURE — 7100000011 HC PHASE II RECOVERY - ADDTL 15 MIN: Performed by: SURGERY

## 2022-02-01 DEVICE — MESH SURG XL W4.8XL6.7IN L L PORE LTWT FOR INGUINAL HERN: Type: IMPLANTABLE DEVICE | Site: INGUINAL | Status: FUNCTIONAL

## 2022-02-01 RX ORDER — GLYCOPYRROLATE 0.2 MG/ML
INJECTION INTRAMUSCULAR; INTRAVENOUS
Status: COMPLETED
Start: 2022-02-01 | End: 2022-02-01

## 2022-02-01 RX ORDER — LIDOCAINE HYDROCHLORIDE 20 MG/ML
INJECTION, SOLUTION INTRAVENOUS PRN
Status: DISCONTINUED | OUTPATIENT
Start: 2022-02-01 | End: 2022-02-01 | Stop reason: SDUPTHER

## 2022-02-01 RX ORDER — SODIUM CHLORIDE, SODIUM LACTATE, POTASSIUM CHLORIDE, CALCIUM CHLORIDE 600; 310; 30; 20 MG/100ML; MG/100ML; MG/100ML; MG/100ML
INJECTION, SOLUTION INTRAVENOUS CONTINUOUS
Status: DISCONTINUED | OUTPATIENT
Start: 2022-02-01 | End: 2022-02-01 | Stop reason: HOSPADM

## 2022-02-01 RX ORDER — ONDANSETRON 2 MG/ML
INJECTION INTRAMUSCULAR; INTRAVENOUS PRN
Status: DISCONTINUED | OUTPATIENT
Start: 2022-02-01 | End: 2022-02-01 | Stop reason: SDUPTHER

## 2022-02-01 RX ORDER — ROCURONIUM BROMIDE 10 MG/ML
INJECTION, SOLUTION INTRAVENOUS PRN
Status: DISCONTINUED | OUTPATIENT
Start: 2022-02-01 | End: 2022-02-01 | Stop reason: SDUPTHER

## 2022-02-01 RX ORDER — GLYCOPYRROLATE 0.2 MG/ML
0.2 INJECTION INTRAMUSCULAR; INTRAVENOUS ONCE
Status: COMPLETED | OUTPATIENT
Start: 2022-02-01 | End: 2022-02-01

## 2022-02-01 RX ORDER — FENTANYL CITRATE 50 UG/ML
INJECTION, SOLUTION INTRAMUSCULAR; INTRAVENOUS PRN
Status: DISCONTINUED | OUTPATIENT
Start: 2022-02-01 | End: 2022-02-01 | Stop reason: SDUPTHER

## 2022-02-01 RX ORDER — IBUPROFEN 800 MG/1
800 TABLET ORAL EVERY 6 HOURS PRN
Qty: 20 TABLET | Refills: 0 | Status: SHIPPED | OUTPATIENT
Start: 2022-02-01 | End: 2022-07-18

## 2022-02-01 RX ORDER — MEPERIDINE HYDROCHLORIDE 25 MG/ML
INJECTION INTRAMUSCULAR; INTRAVENOUS; SUBCUTANEOUS
Status: COMPLETED
Start: 2022-02-01 | End: 2022-02-01

## 2022-02-01 RX ORDER — LABETALOL HYDROCHLORIDE 5 MG/ML
INJECTION, SOLUTION INTRAVENOUS PRN
Status: DISCONTINUED | OUTPATIENT
Start: 2022-02-01 | End: 2022-02-01 | Stop reason: SDUPTHER

## 2022-02-01 RX ORDER — MEPERIDINE HYDROCHLORIDE 25 MG/ML
12.5 INJECTION INTRAMUSCULAR; INTRAVENOUS; SUBCUTANEOUS EVERY 5 MIN PRN
Status: DISCONTINUED | OUTPATIENT
Start: 2022-02-01 | End: 2022-02-01 | Stop reason: HOSPADM

## 2022-02-01 RX ORDER — CEFAZOLIN SODIUM 2 G/50ML
2000 SOLUTION INTRAVENOUS
Status: COMPLETED | OUTPATIENT
Start: 2022-02-01 | End: 2022-02-01

## 2022-02-01 RX ORDER — SODIUM CHLORIDE, SODIUM LACTATE, POTASSIUM CHLORIDE, CALCIUM CHLORIDE 600; 310; 30; 20 MG/100ML; MG/100ML; MG/100ML; MG/100ML
INJECTION, SOLUTION INTRAVENOUS CONTINUOUS PRN
Status: DISCONTINUED | OUTPATIENT
Start: 2022-02-01 | End: 2022-02-01 | Stop reason: SDUPTHER

## 2022-02-01 RX ORDER — TRAMADOL HYDROCHLORIDE 50 MG/1
50 TABLET ORAL EVERY 6 HOURS PRN
Qty: 12 TABLET | Refills: 0 | Status: SHIPPED | OUTPATIENT
Start: 2022-02-01 | End: 2022-02-04

## 2022-02-01 RX ORDER — GLYCOPYRROLATE 1 MG/5 ML
SYRINGE (ML) INTRAVENOUS PRN
Status: DISCONTINUED | OUTPATIENT
Start: 2022-02-01 | End: 2022-02-01 | Stop reason: SDUPTHER

## 2022-02-01 RX ORDER — SODIUM CHLORIDE 0.9 % (FLUSH) 0.9 %
5-40 SYRINGE (ML) INJECTION EVERY 12 HOURS SCHEDULED
Status: DISCONTINUED | OUTPATIENT
Start: 2022-02-01 | End: 2022-02-01 | Stop reason: HOSPADM

## 2022-02-01 RX ORDER — BUPIVACAINE HYDROCHLORIDE AND EPINEPHRINE 2.5; 5 MG/ML; UG/ML
INJECTION, SOLUTION EPIDURAL; INFILTRATION; INTRACAUDAL; PERINEURAL PRN
Status: DISCONTINUED | OUTPATIENT
Start: 2022-02-01 | End: 2022-02-01 | Stop reason: ALTCHOICE

## 2022-02-01 RX ORDER — MEPERIDINE HYDROCHLORIDE 25 MG/ML
INJECTION INTRAMUSCULAR; INTRAVENOUS; SUBCUTANEOUS
Status: DISCONTINUED
Start: 2022-02-01 | End: 2022-02-01 | Stop reason: WASHOUT

## 2022-02-01 RX ORDER — PROPOFOL 10 MG/ML
INJECTION, EMULSION INTRAVENOUS PRN
Status: DISCONTINUED | OUTPATIENT
Start: 2022-02-01 | End: 2022-02-01 | Stop reason: SDUPTHER

## 2022-02-01 RX ORDER — DEXAMETHASONE SODIUM PHOSPHATE 10 MG/ML
INJECTION, SOLUTION INTRAMUSCULAR; INTRAVENOUS PRN
Status: DISCONTINUED | OUTPATIENT
Start: 2022-02-01 | End: 2022-02-01 | Stop reason: SDUPTHER

## 2022-02-01 RX ORDER — SODIUM CHLORIDE 9 MG/ML
25 INJECTION, SOLUTION INTRAVENOUS PRN
Status: DISCONTINUED | OUTPATIENT
Start: 2022-02-01 | End: 2022-02-01 | Stop reason: HOSPADM

## 2022-02-01 RX ORDER — SODIUM CHLORIDE 0.9 % (FLUSH) 0.9 %
5-40 SYRINGE (ML) INJECTION PRN
Status: DISCONTINUED | OUTPATIENT
Start: 2022-02-01 | End: 2022-02-01 | Stop reason: HOSPADM

## 2022-02-01 RX ORDER — NEOSTIGMINE METHYLSULFATE 1 MG/ML
INJECTION, SOLUTION INTRAVENOUS PRN
Status: DISCONTINUED | OUTPATIENT
Start: 2022-02-01 | End: 2022-02-01 | Stop reason: SDUPTHER

## 2022-02-01 RX ORDER — TRAMADOL HYDROCHLORIDE 50 MG/1
50 TABLET ORAL ONCE
Status: COMPLETED | OUTPATIENT
Start: 2022-02-01 | End: 2022-02-01

## 2022-02-01 RX ADMIN — LIDOCAINE HYDROCHLORIDE 60 MG: 20 INJECTION, SOLUTION INTRAVENOUS at 07:55

## 2022-02-01 RX ADMIN — PROPOFOL 30 MG: 10 INJECTION, EMULSION INTRAVENOUS at 08:18

## 2022-02-01 RX ADMIN — GLYCOPYRROLATE 0.2 MG: 0.2 INJECTION INTRAMUSCULAR; INTRAVENOUS at 09:16

## 2022-02-01 RX ADMIN — MEPERIDINE HYDROCHLORIDE 12.5 MG: 25 INJECTION, SOLUTION INTRAMUSCULAR; INTRAVENOUS; SUBCUTANEOUS at 08:45

## 2022-02-01 RX ADMIN — PROPOFOL 140 MG: 10 INJECTION, EMULSION INTRAVENOUS at 07:55

## 2022-02-01 RX ADMIN — Medication 3 MG: at 08:17

## 2022-02-01 RX ADMIN — ONDANSETRON 4 MG: 2 INJECTION INTRAMUSCULAR; INTRAVENOUS at 08:00

## 2022-02-01 RX ADMIN — DEXAMETHASONE SODIUM PHOSPHATE 10 MG: 10 INJECTION, SOLUTION INTRAMUSCULAR; INTRAVENOUS at 08:00

## 2022-02-01 RX ADMIN — FENTANYL CITRATE 50 MCG: 50 INJECTION, SOLUTION INTRAMUSCULAR; INTRAVENOUS at 07:54

## 2022-02-01 RX ADMIN — LABETALOL HYDROCHLORIDE 5 MG: 5 INJECTION INTRAVENOUS at 08:05

## 2022-02-01 RX ADMIN — FENTANYL CITRATE 50 MCG: 50 INJECTION, SOLUTION INTRAMUSCULAR; INTRAVENOUS at 08:10

## 2022-02-01 RX ADMIN — TRAMADOL HYDROCHLORIDE 50 MG: 50 TABLET, FILM COATED ORAL at 10:24

## 2022-02-01 RX ADMIN — Medication 0.6 MG: at 08:17

## 2022-02-01 RX ADMIN — ROCURONIUM BROMIDE 35 MG: 10 SOLUTION INTRAVENOUS at 07:56

## 2022-02-01 RX ADMIN — MEPERIDINE HYDROCHLORIDE 12.5 MG: 25 INJECTION, SOLUTION INTRAMUSCULAR; INTRAVENOUS; SUBCUTANEOUS at 09:01

## 2022-02-01 RX ADMIN — SODIUM CHLORIDE, POTASSIUM CHLORIDE, SODIUM LACTATE AND CALCIUM CHLORIDE: 600; 310; 30; 20 INJECTION, SOLUTION INTRAVENOUS at 06:16

## 2022-02-01 RX ADMIN — LABETALOL HYDROCHLORIDE 5 MG: 5 INJECTION INTRAVENOUS at 08:10

## 2022-02-01 RX ADMIN — SODIUM CHLORIDE, POTASSIUM CHLORIDE, SODIUM LACTATE AND CALCIUM CHLORIDE: 600; 310; 30; 20 INJECTION, SOLUTION INTRAVENOUS at 07:51

## 2022-02-01 RX ADMIN — CEFAZOLIN SODIUM 2000 MG: 2 SOLUTION INTRAVENOUS at 07:59

## 2022-02-01 ASSESSMENT — PULMONARY FUNCTION TESTS
PIF_VALUE: 21
PIF_VALUE: 21
PIF_VALUE: 6
PIF_VALUE: 22
PIF_VALUE: 19
PIF_VALUE: 34
PIF_VALUE: 32
PIF_VALUE: 2
PIF_VALUE: 2
PIF_VALUE: 22
PIF_VALUE: 32
PIF_VALUE: 34
PIF_VALUE: 16
PIF_VALUE: 35
PIF_VALUE: 3
PIF_VALUE: 21
PIF_VALUE: 33
PIF_VALUE: 15
PIF_VALUE: 2
PIF_VALUE: 35
PIF_VALUE: 21
PIF_VALUE: 22
PIF_VALUE: 26
PIF_VALUE: 1
PIF_VALUE: 21
PIF_VALUE: 15
PIF_VALUE: 33
PIF_VALUE: 2
PIF_VALUE: 18

## 2022-02-01 ASSESSMENT — PAIN DESCRIPTION - ORIENTATION
ORIENTATION: RIGHT;LOWER
ORIENTATION: MID;LEFT;LOWER
ORIENTATION: RIGHT;LOWER
ORIENTATION: MID;RIGHT

## 2022-02-01 ASSESSMENT — PAIN DESCRIPTION - FREQUENCY
FREQUENCY: CONTINUOUS

## 2022-02-01 ASSESSMENT — PAIN DESCRIPTION - LOCATION
LOCATION: ABDOMEN

## 2022-02-01 ASSESSMENT — PAIN DESCRIPTION - PAIN TYPE
TYPE: ACUTE PAIN;SURGICAL PAIN
TYPE: SURGICAL PAIN;ACUTE PAIN
TYPE: ACUTE PAIN;SURGICAL PAIN
TYPE: SURGICAL PAIN

## 2022-02-01 ASSESSMENT — PAIN DESCRIPTION - PROGRESSION
CLINICAL_PROGRESSION: GRADUALLY WORSENING
CLINICAL_PROGRESSION: NOT CHANGED
CLINICAL_PROGRESSION: GRADUALLY WORSENING

## 2022-02-01 ASSESSMENT — PAIN DESCRIPTION - DESCRIPTORS
DESCRIPTORS: ACHING;DISCOMFORT

## 2022-02-01 ASSESSMENT — PAIN DESCRIPTION - ONSET: ONSET: ON-GOING

## 2022-02-01 ASSESSMENT — PAIN SCALES - GENERAL
PAINLEVEL_OUTOF10: 5
PAINLEVEL_OUTOF10: 2
PAINLEVEL_OUTOF10: 4

## 2022-02-01 ASSESSMENT — PAIN - FUNCTIONAL ASSESSMENT: PAIN_FUNCTIONAL_ASSESSMENT: 0-10

## 2022-02-01 NOTE — ANESTHESIA POSTPROCEDURE EVALUATION
Department of Anesthesiology  Postprocedure Note    Patient: Tanesha Whitley  MRN: [id number]  YOB: 1935  Date of evaluation: 2/1/2022  Time:  9:43 AM     Procedure Summary     Date: 02/01/22 Room / Location: [address] [place] [address] 03 / [facility]    Anesthesia Start: 6705 Anesthesia Stop: 1707    Procedure: LAPAROSCOPIC POSSIBLE OPEN LEFT INGUINAL HERNIA REPAIR WITH MESH (Left Abdomen) Diagnosis: (INGUINAL HERNIA)    Surgeons: Alyssa Coleman MD Responsible Provider: Johann Zacarias MD    Anesthesia Type: general ASA Status: 2          Anesthesia Type: general    Harriett Phase I: Harriett Score: 5    Harriett Phase II:      Last vitals: Reviewed and per EMR flowsheets.        Anesthesia Post Evaluation    Patient location during evaluation: PACU  Patient participation: complete - patient participated  Level of consciousness: awake and alert  Pain score: 0  Airway patency: patent  Nausea & Vomiting: no nausea and no vomiting  Complications: no  Cardiovascular status: blood pressure returned to baseline  Respiratory status: acceptable and room air  Hydration status: euvolemic

## 2022-02-01 NOTE — ANESTHESIA PRE PROCEDURE
difficulty, bilateral     Hyperlipidemia     Spinal stenosis of lumbar region     Stricture of urethral meatus in male     after prostate surgery       Past Surgical History:        Procedure Laterality Date    CATARACT REMOVAL  2020    CYSTOSCOPY  2015    PROSTATECTOMY  2007    Laser        Social History:    Social History     Tobacco Use    Smoking status: Never Smoker    Smokeless tobacco: Never Used   Substance Use Topics    Alcohol use:  Yes     Alcohol/week: 1.0 standard drink     Types: 1 Glasses of wine per week     Comment: social                                Counseling given: Not Answered      Vital Signs (Current):   Vitals:    02/01/22 0556   BP: (!) 151/76   Pulse: 81   Resp: 16   Temp: 98 °F (36.7 °C)   TempSrc: Infrared   SpO2: 98%                                              BP Readings from Last 3 Encounters:   02/01/22 (!) 151/76   01/28/22 (!) 162/78   01/25/22 136/86       NPO Status: Time of last liquid consumption: 2100                        Time of last solid consumption: 1700                        Date of last liquid consumption: 01/31/22                        Date of last solid food consumption: 01/31/22    BMI:   Wt Readings from Last 3 Encounters:   01/28/22 192 lb (87.1 kg)   01/25/22 190 lb 12.8 oz (86.5 kg)   12/20/21 190 lb (86.2 kg)     There is no height or weight on file to calculate BMI.    CBC:   Lab Results   Component Value Date    WBC 6.2 01/28/2022    RBC 4.02 01/28/2022    HGB 12.7 01/28/2022    HCT 39.1 01/28/2022    MCV 97.3 01/28/2022    RDW 12.7 01/28/2022     01/28/2022       CMP:   Lab Results   Component Value Date     01/28/2022    K 4.6 01/28/2022     01/28/2022    CO2 27 01/28/2022    BUN 15 01/28/2022    CREATININE 1.0 01/28/2022    GFRAA >60 01/28/2022    LABGLOM >60 01/28/2022    GLUCOSE 105 01/28/2022    GLUCOSE 101 08/30/2011    PROT 6.7 12/15/2021    CALCIUM 9.2 01/28/2022    BILITOT 0.8 12/15/2021    ALKPHOS 72 12/15/2021 AST 15 12/15/2021    ALT 11 12/15/2021       POC Tests: No results for input(s): POCGLU, POCNA, POCK, POCCL, POCBUN, POCHEMO, POCHCT in the last 72 hours. Coags: No results found for: PROTIME, INR, APTT    HCG (If Applicable): No results found for: PREGTESTUR, PREGSERUM, HCG, HCGQUANT     ABGs: No results found for: PHART, PO2ART, ETA4UWH, JZH9DDF, BEART, E3KOGSSL     Type & Screen (If Applicable):  No results found for: LABABO, LABRH    Drug/Infectious Status (If Applicable):  No results found for: HIV, HEPCAB    COVID-19 Screening (If Applicable): No results found for: COVID19        Anesthesia Evaluation  Patient summary reviewed  Airway: Mallampati: II  TM distance: >3 FB   Neck ROM: full  Mouth opening: > = 3 FB Dental:          Pulmonary:Negative Pulmonary ROS breath sounds clear to auscultation                             Cardiovascular:          ECG reviewed  Rhythm: regular  Rate: normal                 ROS comment: EKG: Normal Sinus Rhythm 75, NS Intra ventricular Block. Neuro/Psych:   Negative Neuro/Psych ROS               ROS comment: DDD L/S with spinal Stenosis. GI/Hepatic/Renal:            ROS comment: Benign prostatic hypertrophy  Epididymo Orchitis. Urethral Stricture. .   Endo/Other:    (+) malignancy/cancer (Baso squamous Carcinoma. ). ROS comment: Bilateral Hearing defect. Abdominal:             Vascular: negative vascular ROS. Other Findings:             Anesthesia Plan      general     ASA 2       Induction: intravenous. BIS  MIPS: Postoperative opioids intended. Anesthetic plan and risks discussed with patient. Plan discussed with CRNA.     Attending anesthesiologist reviewed and agrees with Desean Reyna MD   2/1/2022

## 2022-02-01 NOTE — OP NOTE
DATE OF PROCEDURE:  2/1/2022    SURGEON: SOCORRO Elkins Cheryl: Manny Arciniega. PREOPERATIVE DIAGNOSES: left inguinal hernia, possible bilateral     POSTOPERATIVE DIAGNOSIS: left inguinal hernia      OPERATION: 1.)Laparoscopic transabdominal left inguinal hernia repair with   Mesh  2.) Excision of cord lipoma     ANESTHESIA: General.     ESTIMATED BLOOD LOSS: Minimal.     COMPLICATIONS: None. FLUIDS: Crystalloid. SPECIMEN: None. DISPOSITION: Discharged home. INDICATION FOR SURGERY: This is a 50-year-old male   who presented with left inguinal swelling and complaints of pain consistent with a left inguinal hernia that was verified with physical exam. We explained the risks and benefits,   potential outcomes and alternatives of treatment of the aforementioned   treatment, including risk of opening surgical site, infection, mesh infection   and needing removal, conversion to open procedure and he agreed to proceed understanding those risks and potential outcomes. PROCEDURE: The patient was brought into the operating suite, had bilateral   PCDs placed, was on preoperative antibiotics, was placed under general   anesthesia, and was then prepped and draped in normal sterile condition. Once this was done, a 5-mm incision was made infraumbilically. Veress needle   was passed into the peritoneum. Meniscus test verified proper location. CO2   was used to insufflate the abdomen to a pressure of 15 mmHg. At that time,   the Veress needle was removed and a 5-mm trocar was put into its place. A   laparoscope was introduced through this trocar and under direct visualization   with the laparoscope, 2 additional 5-mm trocars were placed, 1 on the right   abdomen 4 fingerbreadths lateral into the same line as the umbilicus, and   then a similar mirror image on the left side. Once all 3 ports were in   placed, we explored the abdomen. There was an obvious left indirect   inguinal hernia.     We began our dissection by taking the scissors and making a peritoneal   incision more proximal on the abdominal wall from the hernia site. Once we   were able to get into the preperitoneal space, we bluntly dissected out the   preperitoneal space down to the hernia sac. The hernia sac was then bluntly   dissected free from the spermatic cord and its contents. A cord lipoma was dissected free and excised. We were able to   visualize the vas deferens and pay close attention to not injure this as well   as the vasculature of the cord contents. Once we were able to reduce the   hernia and free the peritoneum from the spermatic cord, and fully reduce the   hernia, we brought in a Bard 3DMax Light mesh, introduced it through one of   the trocars and then placed it into the left inguinal area. Once this was   done, it was tacked to the pubic tubercle medially and a few other   stabilizing tacks were placed throughout the mesh. These were paid close   attention to not place any so-called \"triangle of doom\" or \"triangle of pain\". Once this was completed, the peritoneum was then tacked upon itself to close   over the mesh to assure no bowel would come in contact with the mesh. Once this was completed and we were content with the placement of the mesh,   the pneumoperitoneum was evacuated. The trocars were removed. At that   point, the skin was approximated in all trocar sites with 4-0 Monocryl   sutures in a subcuticular fashion. The patient was woken up in stable   condition and taken to PACU for postoperative care.       Johnathan Miller MD  8:20 AM  2/1/2022

## 2022-02-01 NOTE — H&P
General Surgery History and Physical     Patient's Name/Date of Birth: Alexander Haider / 1935     Date: 2/1/2022       Surgeon: Mara Maldonado M.D.     PCP: Neris Leiva DO     Chief Complaint: left  inguinal hernia     HPI:   Alexander Haider is a 81 y.o. male who presents for evaluation of left inguinal hernia this reducible but has become enlarging and causing more pain. Is tolerating a diet and moving bowels.         Past Medical History        Past Medical History:   Diagnosis Date    BPH (benign prostatic hyperplasia)              Past Surgical History   History reviewed. No pertinent surgical history.        Current Facility-Administered Medications          Current Outpatient Medications   Medication Sig Dispense Refill    tamsulosin (FLOMAX) 0.4 MG capsule Take 0.4 mg by mouth daily        aspirin 81 MG chewable tablet Take 81 mg by mouth daily        Multiple Vitamins-Minerals (THERAPEUTIC MULTIVITAMIN-MINERALS) tablet Take 1 tablet by mouth daily          No current facility-administered medications for this visit.            No Known Allergies     The patient has a family history that is negative for severe cardiovascular or respiratory issues, negative for reaction to anesthesia.     Social History               Socioeconomic History    Marital status:        Spouse name: Not on file    Number of children: Not on file    Years of education: Not on file    Highest education level: Not on file   Occupational History    Not on file   Tobacco Use    Smoking status: Never Smoker    Smokeless tobacco: Never Used   Vaping Use    Vaping Use: Never used   Substance and Sexual Activity    Alcohol use:  Yes       Comment: social    Drug use: Not on file    Sexual activity: Not on file   Other Topics Concern    Not on file   Social History Narrative    Not on file      Social Determinants of Health      Financial Resource Strain:     Difficulty of Paying Living Expenses: Not on file   Food gas/bloating, heartburn or hematemesis  Genito-Urinary ROS: negative for -  genital discharge, genital ulcers or hematuria  Musculoskeletal ROS: negative for - gait disturbance, muscle pain or muscular weakness     Physical exam:   BP (!) 144/82 (Site: Left Upper Arm, Position: Sitting, Cuff Size: Medium Adult)   Pulse 79   Temp 97.7 °F (36.5 °C) (Temporal)   Resp 18   Ht 5' 6\" (1.676 m)   Wt 190 lb (86.2 kg)   SpO2 96%   BMI 30.67 kg/m²   General appearance:  NAD  Pyscho/social: negative for tremors and hallucinations  Head: NCAT, PERRLA, EOMI, red conjunctiva  Neck: supple, no masses  Lungs: CTAB, equal chest rise bilateral  Heart: Reg rate  Abdomen: soft, minimally tender, nondistended, left reducible hernia   Skin; no lesions  Gu: no cva tenderness  Extremities: extremities normal, atraumatic, no cyanosis or edema     Assessment:  81 y.o. male with left inguinal hernia      Plan: To OR   Discussed the risk, benefits and alternatives of surgery including wound infections, bleeding, scar and hernia formation and the risks of general anesthetic including MI, CVA, sudden death or reactions to anesthetic medications. The patient understands the risks and alternatives and the possibility of converting to an open procedure.  All questions were answered to the patient's satisfaction and they freely signed the consent.           Alanna Nicole MD

## 2022-02-01 NOTE — PROGRESS NOTES
Dr. Medina Resides notified of the patient's heart rate being as low as 41, order for Roselia given.

## 2022-02-14 ENCOUNTER — OFFICE VISIT (OUTPATIENT)
Dept: SURGERY | Age: 87
End: 2022-02-14

## 2022-02-14 VITALS
HEART RATE: 80 BPM | DIASTOLIC BLOOD PRESSURE: 93 MMHG | HEIGHT: 66 IN | TEMPERATURE: 97.5 F | SYSTOLIC BLOOD PRESSURE: 156 MMHG | BODY MASS INDEX: 30.86 KG/M2 | WEIGHT: 192 LBS

## 2022-02-14 DIAGNOSIS — Z09 POSTOPERATIVE EXAMINATION: Primary | ICD-10-CM

## 2022-02-14 PROCEDURE — 99024 POSTOP FOLLOW-UP VISIT: CPT | Performed by: SURGERY

## 2022-02-14 NOTE — PROGRESS NOTES
Surgery Progress Note            Chief complaint:   Patient Active Problem List   Diagnosis    Left inguinal hernia       S: doing well    O:   Vitals:    02/14/22 1302   BP: (!) 156/93   Pulse: 80   Temp: 97.5 °F (36.4 °C)     No intake or output data in the 24 hours ending 02/14/22 1316        Labs:  No results for input(s): WBC, HGB, HCT in the last 72 hours. Invalid input(s): PLR  Lab Results   Component Value Date    CREATININE 1.0 01/28/2022    BUN 15 01/28/2022     01/28/2022    K 4.6 01/28/2022     01/28/2022    CO2 27 01/28/2022     No results for input(s): LIPASE, AMYLASE in the last 72 hours. Physical exam:   BP (!) 156/93 (Site: Left Upper Arm, Position: Sitting, Cuff Size: Medium Adult)   Pulse 80   Temp 97.5 °F (36.4 °C)   Ht 5' 6\" (1.676 m)   Wt 192 lb (87.1 kg)   BMI 30.99 kg/m²   General appearance: NAD  Head: NCAT  Neck: supple, no masses  Lungs: equal chest rise bilateral  Heart: S1S2 present  Abdomen: soft, nontender, nondistended  Skin; no new lesions, incisions clean and intact  Gu: no cva tenderness  Extremities: extremities normal, atraumatic, no cyanosis or edema    A:  Post op lap LIHR with mesh    P: follow up as needed.      Whitney Starkey MD, MD  2/14/2022

## 2022-03-16 ENCOUNTER — TELEPHONE (OUTPATIENT)
Dept: PRIMARY CARE CLINIC | Age: 87
End: 2022-03-16

## 2022-03-16 ENCOUNTER — NURSE ONLY (OUTPATIENT)
Dept: PRIMARY CARE CLINIC | Age: 87
End: 2022-03-16

## 2022-03-16 VITALS — SYSTOLIC BLOOD PRESSURE: 130 MMHG | DIASTOLIC BLOOD PRESSURE: 72 MMHG

## 2022-03-16 DIAGNOSIS — R04.0 BLEEDING FROM THE NOSE: ICD-10-CM

## 2022-03-16 PROCEDURE — 99999 PR OFFICE/OUTPT VISIT,PROCEDURE ONLY: CPT | Performed by: FAMILY MEDICINE

## 2022-03-17 ENCOUNTER — TELEPHONE (OUTPATIENT)
Dept: PRIMARY CARE CLINIC | Age: 87
End: 2022-03-17

## 2022-03-17 DIAGNOSIS — R04.0 EPISTAXIS, RECURRENT: Primary | ICD-10-CM

## 2022-03-17 NOTE — TELEPHONE ENCOUNTER
Pc from pt states he previously saw Dr Elo Haider in the past. He would like referral to an ENT due to nose bleeds.

## 2022-04-07 ENCOUNTER — TELEPHONE (OUTPATIENT)
Dept: ENT CLINIC | Age: 87
End: 2022-04-07

## 2022-04-07 NOTE — TELEPHONE ENCOUNTER
New pt, Ref: Dr Graeme Montero,  Epistaxis, recurrent scheduled 5/11/22. Patient calling requesting to be seen sooner, States has had nose bleed x6 within last couple of weeks. On wait list. Is holding off on aspirin since last week.  Please advise [phone]

## 2022-04-11 ENCOUNTER — OFFICE VISIT (OUTPATIENT)
Dept: ENT CLINIC | Age: 87
End: 2022-04-11
Payer: MEDICARE

## 2022-04-11 ENCOUNTER — TELEPHONE (OUTPATIENT)
Dept: ENT CLINIC | Age: 87
End: 2022-04-11

## 2022-04-11 VITALS
SYSTOLIC BLOOD PRESSURE: 165 MMHG | BODY MASS INDEX: 30.53 KG/M2 | DIASTOLIC BLOOD PRESSURE: 83 MMHG | HEART RATE: 76 BPM | HEIGHT: 66 IN | WEIGHT: 190 LBS

## 2022-04-11 DIAGNOSIS — R04.0 EPISTAXIS: Primary | ICD-10-CM

## 2022-04-11 PROCEDURE — G8427 DOCREV CUR MEDS BY ELIG CLIN: HCPCS | Performed by: NURSE PRACTITIONER

## 2022-04-11 PROCEDURE — 30901 CONTROL OF NOSEBLEED: CPT | Performed by: NURSE PRACTITIONER

## 2022-04-11 PROCEDURE — G8417 CALC BMI ABV UP PARAM F/U: HCPCS | Performed by: NURSE PRACTITIONER

## 2022-04-11 PROCEDURE — 1123F ACP DISCUSS/DSCN MKR DOCD: CPT | Performed by: NURSE PRACTITIONER

## 2022-04-11 PROCEDURE — 4040F PNEUMOC VAC/ADMIN/RCVD: CPT | Performed by: NURSE PRACTITIONER

## 2022-04-11 PROCEDURE — 1036F TOBACCO NON-USER: CPT | Performed by: NURSE PRACTITIONER

## 2022-04-11 PROCEDURE — 99203 OFFICE O/P NEW LOW 30 MIN: CPT | Performed by: NURSE PRACTITIONER

## 2022-04-11 RX ORDER — ECHINACEA PURPUREA EXTRACT 125 MG
2 TABLET ORAL 4 TIMES DAILY
Qty: 3 EACH | Refills: 3 | Status: SHIPPED | OUTPATIENT
Start: 2022-04-11

## 2022-04-11 ASSESSMENT — ENCOUNTER SYMPTOMS
SINUS PRESSURE: 0
RESPIRATORY NEGATIVE: 1
STRIDOR: 0
SHORTNESS OF BREATH: 0
EYES NEGATIVE: 1
RHINORRHEA: 1
SINUS PAIN: 0

## 2022-04-11 NOTE — PROGRESS NOTES
[facility] Otolaryngology  Dr. Carlos Pino D.O. Ms.Ed. New Consult       Patient Name:  Malik Peña  :  1935     CHIEF C/O:    Chief Complaint   Patient presents with    New Patient     NP-Epistaxis       HISTORY OBTAINED FROM:  patient    HISTORY OF PRESENT ILLNESS:       Juliano Santos is a 81y.o. year old male, here today for recurrent nosebleeds.     Symptoms for 1 month  Always left nostril  Last bleeding this am  States bleeding last 5-15 minutes on average  No ER visits, has been able to manage, carries supplies with him incase bleeding occurs  Was taking 81 mg ASA but stopped when bleeding stopped  Was taking  for pain after hernia surgery but not for several months  No other blood thinners  No known bleeding disorders  No previous hx of nosebleeds in the past  Symptoms started after his hernia surgery  Does complain of persistent rhinorrhea without congestion or PND  No sinus pain or pressure  No recent or previous nasal trauma            Past Medical History:   Diagnosis Date    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    BPH (benign prostatic hyperplasia)     Epididymo-orchitis     H/O degenerative disc disease     Hearing difficulty, bilateral     Hyperlipidemia     Spinal stenosis of lumbar region     Stricture of urethral meatus in male     after prostate surgery     Past Surgical History:   Procedure Laterality Date    CATARACT REMOVAL      CYSTOSCOPY  2015    HERNIA REPAIR Left 2022    LAPAROSCOPIC POSSIBLE OPEN LEFT INGUINAL HERNIA REPAIR WITH MESH performed by Kate Camp MD at [facility]  2007    Laser        Current Outpatient Medications:     ibuprofen (ADVIL;MOTRIN) 800 MG tablet, Take 1 tablet by mouth every 6 hours as needed for Pain, Disp: 20 tablet, Rfl: 0    hydrocortisone 2.5 % cream, Apply topically as needed , Disp: , Rfl:     tamsulosin (FLOMAX) 0.4 MG capsule, Take 0.4 mg by mouth at bedtime , Disp: , Rfl:     Multiple Vitamins-Minerals (THERAPEUTIC MULTIVITAMIN-MINERALS) tablet, Take 1 tablet by mouth daily, Disp: , Rfl:   Amoxil [amoxicillin]  Social History     Tobacco Use    Smoking status: Never Smoker    Smokeless tobacco: Never Used   Vaping Use    Vaping Use: Never used   Substance Use Topics    Alcohol use: Yes     Alcohol/week: 1.0 standard drink     Types: 1 Glasses of wine per week     Comment: social    Drug use: Never     Family History   Problem Relation Age of Onset    Heart Failure Mother     Dementia Father     Heart Disease Brother     Stroke Brother        Review of Systems   Constitutional: Negative. Negative for activity change and appetite change. HENT: Positive for nosebleeds and rhinorrhea. Negative for congestion, postnasal drip, sinus pressure and sinus pain. Eyes: Negative. Respiratory: Negative. Negative for shortness of breath and stridor. Cardiovascular: Negative. Negative for chest pain and palpitations. Endocrine: Negative. Musculoskeletal: Negative. Skin: Negative. Neurological: Negative. Negative for dizziness. Hematological: Negative. Psychiatric/Behavioral: Negative. BP (!) 165/83   Pulse 76   Ht 5' 6\" (1.676 m)   Wt 190 lb (86.2 kg)   BMI 30.67 kg/m²   Physical Exam  Constitutional:       Appearance: Normal appearance. HENT:      Head: Normocephalic. Right Ear: Tympanic membrane, ear canal and external ear normal.      Left Ear: Tympanic membrane, ear canal and external ear normal.      Nose: Nose normal.      Left Nostril: No epistaxis. Right Turbinates: Not pale. Left Turbinates: Not pale. Mouth/Throat:      Lips: Pink. Mouth: Mucous membranes are moist.      Pharynx: Oropharynx is clear. Eyes:      Conjunctiva/sclera: Conjunctivae normal.      Pupils: Pupils are equal, round, and reactive to light. Cardiovascular:      Rate and Rhythm: Normal rate and regular rhythm. Pulses: Normal pulses.    Pulmonary:      Effort: Pulmonary effort is normal. No respiratory distress. Breath sounds: No stridor. Musculoskeletal:         General: Normal range of motion. Cervical back: Normal range of motion. No rigidity. No muscular tenderness. Skin:     General: Skin is warm and dry. Neurological:      General: No focal deficit present. Mental Status: He is alert and oriented to person, place, and time. Psychiatric:         Mood and Affect: Mood normal.         Behavior: Behavior normal.         Thought Content: Thought content normal.         Judgment: Judgment normal.         IMPRESSION/PLAN:      Nasal packing:    Physical examination reveals prominent vessel on the anterior superior nasal septum with signs of recent bleeding. No active bleeding or oozing is present at this time. Areas anesthetized by oxymetazoline and lidocaine nasal spray with fibrillar packing placed to reinforce the area. Patient tolerated well with no additional bleeding. Elissa Morales was seen today for new patient. Diagnoses and all orders for this visit:    Epistaxis  -     WA CTRL NOSEBLEED,ANTER,SIMPLE    Other orders  -     sodium chloride (ALTAMIST SPRAY) 0.65 % nasal spray; 2 sprays by Nasal route 4 times daily      Fibrillar packing is placed within the left nostril for reinforcement of a prominent vessel on the left nasal septum. Patient will begin using nasal saline spray, 2 sprays each nostril 4 times daily. He is instructed that the packing may fall out in the next 1 to 3 days and is instructed not to replace the packing but continue with his nasal saline spray. He will follow up in 2 weeks for reevaluation. He is instructed to refrain from nose blowing, other digital stimulation of the nose or any strenuous activity during this time. He is instructed to call the office for any bleeding uncontrolled during normal business hours or proceed to the emergency department for bleeding after hours or weekends. He agrees to this plan. He is to call for any new or worsening symptoms prior to his next appointment.       Antwan Jin, BURT, FNP-C  8 [facility], Nose and Throat    The information contained in this note has been dictated using drug and medical speech recognition software and may contain errors

## 2022-04-11 NOTE — TELEPHONE ENCOUNTER
Called pt after speaking with [facility] via Teams regarding an opening at the [address] [place] [address] ENT location. Notified pt of opening, gave pt address and phone number. Pt verified understanding, notified to arrive 15 mins prior to appt.

## 2022-04-13 ENCOUNTER — TELEPHONE (OUTPATIENT)
Dept: ENT CLINIC | Age: 87
End: 2022-04-13

## 2022-04-13 NOTE — TELEPHONE ENCOUNTER
Pt called concerned that packing has swollen up and he cannot sniff up any of the spray as instructed and wants to know if he should continue to spray into the packing?  Please advise

## 2022-04-13 NOTE — TELEPHONE ENCOUNTER
That is ok. Continue to spray with saline. Call for any uncontrolled bleeding. Otherwise follow up as previously scheduled.

## 2022-04-21 ENCOUNTER — TELEPHONE (OUTPATIENT)
Dept: PRIMARY CARE CLINIC | Age: 87
End: 2022-04-21

## 2022-04-21 NOTE — TELEPHONE ENCOUNTER
Patient asking if you are recommending he get the 2nd Covid Booster shot      Please advise    [phone]

## 2022-04-25 ENCOUNTER — OFFICE VISIT (OUTPATIENT)
Dept: ENT CLINIC | Age: 87
End: 2022-04-25
Payer: MEDICARE

## 2022-04-25 VITALS — BODY MASS INDEX: 30.53 KG/M2 | WEIGHT: 190 LBS | HEIGHT: 66 IN

## 2022-04-25 DIAGNOSIS — R04.0 EPISTAXIS: Primary | ICD-10-CM

## 2022-04-25 DIAGNOSIS — J34.89 NASAL DRYNESS: ICD-10-CM

## 2022-04-25 PROCEDURE — G8427 DOCREV CUR MEDS BY ELIG CLIN: HCPCS | Performed by: NURSE PRACTITIONER

## 2022-04-25 PROCEDURE — 99213 OFFICE O/P EST LOW 20 MIN: CPT | Performed by: NURSE PRACTITIONER

## 2022-04-25 PROCEDURE — G8417 CALC BMI ABV UP PARAM F/U: HCPCS | Performed by: NURSE PRACTITIONER

## 2022-04-25 PROCEDURE — 4040F PNEUMOC VAC/ADMIN/RCVD: CPT | Performed by: NURSE PRACTITIONER

## 2022-04-25 PROCEDURE — 1036F TOBACCO NON-USER: CPT | Performed by: NURSE PRACTITIONER

## 2022-04-25 PROCEDURE — 1123F ACP DISCUSS/DSCN MKR DOCD: CPT | Performed by: NURSE PRACTITIONER

## 2022-04-25 RX ORDER — SODIUM CHLORIDE/ALOE VERA
GEL (GRAM) NASAL 2 TIMES DAILY
Qty: 1 EACH | Refills: 3 | Status: SHIPPED | OUTPATIENT
Start: 2022-04-25

## 2022-04-25 ASSESSMENT — ENCOUNTER SYMPTOMS
RHINORRHEA: 0
RESPIRATORY NEGATIVE: 1
SINUS PRESSURE: 0
EYES NEGATIVE: 1
SINUS PAIN: 0
SHORTNESS OF BREATH: 0
STRIDOR: 0

## 2022-04-25 NOTE — PROGRESS NOTES
[facility] Otolaryngology  Dr. River Masters. Sanjay Dumont. Ms.Ed        Patient Name:  Dakota Peoples  :  1935     CHIEF C/O:    Chief Complaint   Patient presents with    Follow-up     patient states he has had very small bleed since last visit. using humififier at night. states that his nose and mouth are still very dry. HISTORY OBTAINED FROM:  patient    HISTORY OF PRESENT ILLNESS:       Peterson is a 81y.o. year old male, here today for follow up of nosebleeds, nasal dryness.     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    BPH (benign prostatic hyperplasia)     Epididymo-orchitis     H/O degenerative disc disease     Hearing difficulty, bilateral     Hyperlipidemia     Spinal stenosis of lumbar region     Stricture of urethral meatus in male     after prostate surgery     Past Surgical History:   Procedure Laterality Date    CATARACT REMOVAL      CYSTOSCOPY      HERNIA REPAIR Left 2022    LAPAROSCOPIC POSSIBLE OPEN LEFT INGUINAL HERNIA REPAIR WITH MESH performed by Josephine Lockett MD at [facility]  2007    Laser        Current Outpatient Medications:     sodium chloride (ALTAMIST SPRAY) 0.65 % nasal spray, 2 sprays by Nasal route 4 times daily, Disp: 3 each, Rfl: 3    ibuprofen (ADVIL;MOTRIN) 800 MG tablet, Take 1 tablet by mouth every 6 hours as needed for Pain, Disp: 20 tablet, Rfl: 0    hydrocortisone 2.5 % cream, Apply topically as needed , Disp: , Rfl:     tamsulosin (FLOMAX) 0.4 MG capsule, Take 0.4 mg by mouth at bedtime , Disp: , Rfl:     Multiple Vitamins-Minerals (THERAPEUTIC MULTIVITAMIN-MINERALS) tablet, Take 1 tablet by mouth daily, Disp: , Rfl:   Amoxil [amoxicillin]  Social History     Tobacco Use    Smoking status: Never Smoker    Smokeless tobacco: Never Used   Vaping Use    Vaping Use: Never used   Substance Use Topics    Alcohol use: Yes     Alcohol/week: 1.0 standard drink     Types: 1 Glasses of wine per week     Comment: social    Drug use: Never     Family History   Problem Relation Age of Onset    Heart Failure Mother     Dementia Father     Heart Disease Brother     Stroke Brother        Review of Systems   Constitutional: Negative. Negative for activity change and appetite change. HENT: Positive for nosebleeds. Negative for congestion, postnasal drip, rhinorrhea, sinus pressure and sinus pain. Eyes: Negative. Respiratory: Negative. Negative for shortness of breath and stridor. Cardiovascular: Negative. Negative for chest pain and palpitations. Endocrine: Negative. Musculoskeletal: Negative. Skin: Negative. Neurological: Negative. Negative for dizziness. Hematological: Negative. Psychiatric/Behavioral: Negative. Ht 5' 6\" (1.676 m)   Wt 190 lb (86.2 kg)   BMI 30.67 kg/m²   Physical Exam  Constitutional:       Appearance: Normal appearance. HENT:      Head: Normocephalic. Right Ear: External ear normal.      Left Ear: External ear normal.      Nose: Nose normal.      Left Nostril: No epistaxis. Right Turbinates: Not pale. Left Turbinates: Not pale. Comments: No further bleeding or exposed vessels of the left septum     Mouth/Throat:      Lips: Pink. Mouth: Mucous membranes are moist.      Pharynx: Oropharynx is clear. Eyes:      Conjunctiva/sclera: Conjunctivae normal.      Pupils: Pupils are equal, round, and reactive to light. Cardiovascular:      Rate and Rhythm: Normal rate and regular rhythm. Pulses: Normal pulses. Pulmonary:      Effort: Pulmonary effort is normal. No respiratory distress. Breath sounds: No stridor. Musculoskeletal:         General: Normal range of motion. Cervical back: Normal range of motion. No rigidity. No muscular tenderness. Skin:     General: Skin is warm and dry. Neurological:      General: No focal deficit present. Mental Status: He is alert and oriented to person, place, and time. Psychiatric:         Mood and Affect: Mood normal.         Behavior: Behavior normal.         Thought Content: Thought content normal.         Judgment: Judgment normal.         IMPRESSION/PLAN:      Arline Cooney was seen today for follow-up. Diagnoses and all orders for this visit:    Epistaxis    Nasal dryness    Other orders  -     saline nasal gel (AYR) GEL; by Nasal route in the morning and at bedtime    Patient will continue with nasal saline spray, 2 sprays each nostril 3-4 times daily as well as his humidifier at nighttime. He will begin using AYR nasal saline gel twice daily. He will follow-up in 3 months. He is instructed to call with any new or worsening symptoms prior to his next appointment.         Manasa Caro, MSN, FNP-C  8 [facility], Nose and Throat    The information contained in this note has been dictated using drug and medical speech recognition software and may contain errors

## 2022-06-16 RX ORDER — TAMSULOSIN HYDROCHLORIDE 0.4 MG/1
CAPSULE ORAL
Qty: 90 CAPSULE | Refills: 0 | Status: SHIPPED
Start: 2022-06-16 | End: 2022-09-13 | Stop reason: SDUPTHER

## 2022-07-18 ENCOUNTER — OFFICE VISIT (OUTPATIENT)
Dept: PRIMARY CARE CLINIC | Age: 87
End: 2022-07-18
Payer: MEDICARE

## 2022-07-18 VITALS
BODY MASS INDEX: 30.7 KG/M2 | HEIGHT: 66 IN | OXYGEN SATURATION: 95 % | TEMPERATURE: 97.3 F | WEIGHT: 191 LBS | DIASTOLIC BLOOD PRESSURE: 72 MMHG | HEART RATE: 71 BPM | SYSTOLIC BLOOD PRESSURE: 134 MMHG

## 2022-07-18 DIAGNOSIS — I83.93 ASYMPTOMATIC VARICOSE VEINS OF BOTH LOWER EXTREMITIES: ICD-10-CM

## 2022-07-18 DIAGNOSIS — Z23 NEED FOR PNEUMOCOCCAL VACCINE: Primary | ICD-10-CM

## 2022-07-18 DIAGNOSIS — E78.5 HYPERLIPIDEMIA, UNSPECIFIED HYPERLIPIDEMIA TYPE: ICD-10-CM

## 2022-07-18 DIAGNOSIS — M48.061 SPINAL STENOSIS OF LUMBAR REGION WITHOUT NEUROGENIC CLAUDICATION: ICD-10-CM

## 2022-07-18 DIAGNOSIS — Z00.00 INITIAL MEDICARE ANNUAL WELLNESS VISIT: ICD-10-CM

## 2022-07-18 PROBLEM — C44.99 MIXED BASAL-SQUAMOUS CELL CARCINOMA: Status: ACTIVE | Noted: 2022-07-18

## 2022-07-18 LAB
HCT VFR BLD CALC: 41.8 % (ref 37–54)
HEMOGLOBIN: 13.6 G/DL (ref 12.5–16.5)
MCH RBC QN AUTO: 31.3 PG (ref 26–35)
MCHC RBC AUTO-ENTMCNC: 32.5 % (ref 32–34.5)
MCV RBC AUTO: 96.3 FL (ref 80–99.9)
PDW BLD-RTO: 13.7 FL (ref 11.5–15)
PLATELET # BLD: 206 E9/L (ref 130–450)
PMV BLD AUTO: 10.5 FL (ref 7–12)
RBC # BLD: 4.34 E12/L (ref 3.8–5.8)
WBC # BLD: 6 E9/L (ref 4.5–11.5)

## 2022-07-18 PROCEDURE — 1123F ACP DISCUSS/DSCN MKR DOCD: CPT | Performed by: FAMILY MEDICINE

## 2022-07-18 PROCEDURE — G0438 PPPS, INITIAL VISIT: HCPCS | Performed by: FAMILY MEDICINE

## 2022-07-18 ASSESSMENT — PATIENT HEALTH QUESTIONNAIRE - PHQ9
1. LITTLE INTEREST OR PLEASURE IN DOING THINGS: 0
SUM OF ALL RESPONSES TO PHQ QUESTIONS 1-9: 0
2. FEELING DOWN, DEPRESSED OR HOPELESS: 0
SUM OF ALL RESPONSES TO PHQ9 QUESTIONS 1 & 2: 0
SUM OF ALL RESPONSES TO PHQ QUESTIONS 1-9: 0

## 2022-07-18 ASSESSMENT — LIFESTYLE VARIABLES
HOW MANY STANDARD DRINKS CONTAINING ALCOHOL DO YOU HAVE ON A TYPICAL DAY: 1 OR 2
HOW OFTEN DO YOU HAVE A DRINK CONTAINING ALCOHOL: MONTHLY OR LESS

## 2022-07-19 LAB
ALBUMIN SERPL-MCNC: 4.2 G/DL (ref 3.5–5.2)
ALP BLD-CCNC: 75 U/L (ref 40–129)
ALT SERPL-CCNC: 11 U/L (ref 0–40)
ANION GAP SERPL CALCULATED.3IONS-SCNC: 18 MMOL/L (ref 7–16)
AST SERPL-CCNC: 21 U/L (ref 0–39)
BILIRUB SERPL-MCNC: 0.8 MG/DL (ref 0–1.2)
BUN BLDV-MCNC: 16 MG/DL (ref 6–23)
CALCIUM SERPL-MCNC: 9.3 MG/DL (ref 8.6–10.2)
CHLORIDE BLD-SCNC: 109 MMOL/L (ref 98–107)
CHOLESTEROL, TOTAL: 165 MG/DL (ref 0–199)
CO2: 18 MMOL/L (ref 22–29)
CREAT SERPL-MCNC: 1.1 MG/DL (ref 0.7–1.2)
GFR AFRICAN AMERICAN: >60
GFR NON-AFRICAN AMERICAN: >60 ML/MIN/1.73
GLUCOSE FASTING: 82 MG/DL (ref 74–99)
HDLC SERPL-MCNC: 57 MG/DL
LDL CHOLESTEROL CALCULATED: 96 MG/DL (ref 0–99)
POTASSIUM SERPL-SCNC: 4.7 MMOL/L (ref 3.5–5)
SODIUM BLD-SCNC: 145 MMOL/L (ref 132–146)
TOTAL PROTEIN: 7.5 G/DL (ref 6.4–8.3)
TRIGL SERPL-MCNC: 58 MG/DL (ref 0–149)
VLDLC SERPL CALC-MCNC: 12 MG/DL

## 2022-07-25 ENCOUNTER — OFFICE VISIT (OUTPATIENT)
Dept: ENT CLINIC | Age: 87
End: 2022-07-25
Payer: MEDICARE

## 2022-07-25 VITALS
SYSTOLIC BLOOD PRESSURE: 162 MMHG | DIASTOLIC BLOOD PRESSURE: 86 MMHG | HEIGHT: 66 IN | BODY MASS INDEX: 30.7 KG/M2 | WEIGHT: 191 LBS | HEART RATE: 75 BPM

## 2022-07-25 DIAGNOSIS — J34.89 NASAL DRYNESS: ICD-10-CM

## 2022-07-25 DIAGNOSIS — R04.0 EPISTAXIS: Primary | ICD-10-CM

## 2022-07-25 PROCEDURE — 99212 OFFICE O/P EST SF 10 MIN: CPT | Performed by: NURSE PRACTITIONER

## 2022-07-25 PROCEDURE — 1123F ACP DISCUSS/DSCN MKR DOCD: CPT | Performed by: NURSE PRACTITIONER

## 2022-07-25 ASSESSMENT — ENCOUNTER SYMPTOMS
SHORTNESS OF BREATH: 0
SINUS PRESSURE: 0
EYE PAIN: 0
DIARRHEA: 0
COUGH: 0
BACK PAIN: 0
RHINORRHEA: 0
VOMITING: 0
SORE THROAT: 0
ALLERGIC/IMMUNOLOGIC NEGATIVE: 1
EYE DISCHARGE: 0

## 2022-07-25 NOTE — PROGRESS NOTES
Subjective:      Patient ID:     Romana Ficks is a 81 y.o. male  . HPI Comments: Pt returns for epistaxis recheck. Last seen 3 months ago. Pt had problems with epistaxis on theleft side 3 months ago. Pt is not having any problems and has not had any bleeding since the cautery. Chronic O2? no    He continues to use saline and AYR gel. Uses Humidifier. Patient states he has not had any nose bleeds in the past 3 months. 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    Spinal stenosis of lumbar region     Spinal stenosis of lumbar region without neurogenic claudication 7/18/2022    Stricture of urethral meatus in male     after prostate surgery    Varicose veins of both lower extremities 7/18/2022     Past Surgical History:   Procedure Laterality Date    CATARACT REMOVAL  2020    CYSTOSCOPY  2015    HERNIA REPAIR Left 2/1/2022    LAPAROSCOPIC POSSIBLE OPEN LEFT INGUINAL HERNIA REPAIR WITH MESH performed by Burt Handy MD at [place] [address]  2007    Laser      Family History   Problem Relation Age of Onset    Heart Failure Mother     Dementia Father     Heart Disease Brother     Stroke Brother      Social History     Socioeconomic History    Marital status:      Spouse name: None    Number of children: None    Years of education: None    Highest education level: None   Tobacco Use    Smoking status: Never    Smokeless tobacco: Never   Vaping Use    Vaping Use: Never used   Substance and Sexual Activity    Alcohol use:  Yes     Alcohol/week: 1.0 standard drink     Types: 1 Glasses of wine per week     Comment: social    Drug use: Never     Social Determinants of Health     Financial Resource Strain: Low Risk     Difficulty of Paying Living Expenses: Not hard at all   Food Insecurity: No Food Insecurity    Worried About Running Out of Food in the Last Year: Never true    Ran Out of Food in the Last Year: Never true   Physical Activity: Inactive    Days of Exercise per Week: 0 days    Minutes of Exercise per Session: 0 min     Allergies   Allergen Reactions    Amoxil [Amoxicillin]      Urine Retention  Pt unsure of allergy       Other  Associated symptoms include congestion. Review of Systems   Constitutional:  Negative for chills and fever. HENT:  Negative for congestion, ear discharge, ear pain, hearing loss, postnasal drip, rhinorrhea, sinus pressure, sneezing, sore throat and tinnitus. Eyes:  Negative for pain and discharge. Respiratory:  Negative for cough and shortness of breath. Cardiovascular:  Negative for chest pain. Gastrointestinal:  Negative for diarrhea and vomiting. Genitourinary:  Negative for flank pain. Musculoskeletal:  Negative for back pain and neck pain. Skin:  Negative for rash. Allergic/Immunologic: Negative. Neurological:  Negative for syncope and headaches. All other systems reviewed and are negative. Objective:     Vitals:    07/25/22 1300   BP: (!) 162/86   Pulse: 75       Physical Exam  Vitals reviewed. Constitutional:       Appearance: Normal appearance. HENT:      Head: Normocephalic and atraumatic. Jaw: There is normal jaw occlusion. No tenderness. Right Ear: Tympanic membrane, ear canal and external ear normal.      Left Ear: Tympanic membrane, ear canal and external ear normal.      Nose: Rhinorrhea present. Right Turbinates: Not swollen or pale. Left Turbinates: Not swollen or pale. Mouth/Throat:      Lips: Pink. Mouth: Mucous membranes are moist.      Pharynx: Oropharynx is clear. Eyes:      General: Lids are normal.      Conjunctiva/sclera: Conjunctivae normal.      Pupils: Pupils are equal, round, and reactive to light.    Cardiovascular:      Rate and Rhythm: Normal rate and regular rhythm. Pulses: Normal pulses. Pulmonary:      Effort: Pulmonary effort is normal. No respiratory distress. Breath sounds: No stridor. Abdominal:      General: Abdomen is flat. Palpations: Abdomen is soft. Musculoskeletal:         General: Normal range of motion. Cervical back: Normal range of motion. No rigidity. Skin:     General: Skin is warm and dry. Neurological:      General: No focal deficit present. Mental Status: He is alert and oriented to person, place, and time. Psychiatric:         Attention and Perception: Attention normal.         Mood and Affect: Affect normal.         Behavior: Behavior normal. Behavior is cooperative. Thought Content: Thought content normal.         Judgment: Judgment normal.           Assessment:       Diagnosis Orders   1. Epistaxis        2. Nasal dryness                   Plan:      Patient is doing well and the epistaxis is  controlled. He continues to do well. Continue saline and AYR gel as previously prescribed. Okay to resume low dose ASA as long as ordering provider is aware. He will follow up as needed. He was instructed to call for any new or worsening symptoms. Follow up prn        Salome Hall, BURT, FNP-C  8 [facility], Nose and Throat    The information contained in this note has been dictated using drug and medical speech recognition software and may contain errors      I Cliffton Furnace CNP am scribing for and in the presence of Salome Hall CNP 1:22 PM 07/25/22      Attestation:     Douglas Valverde CNP, personally performed the services described in this documentation as scribed by Mandie Murrieta CNP in my presence, and it is both accurate and complete.

## 2022-07-27 NOTE — PROGRESS NOTES
Medicare Annual Wellness Visit    Emerita Millan is here for Medicare AWV (Patient states he had hernia surgery feb 1 2022 still has lump. Also states he has been off ASA 81 mg for some time wants to know if its ok )    Assessment & Plan   Need for pneumococcal vaccine  -     Pneumococcal, PCV-13, PREVNAR 13, (age 10 wks+), IM; Future  Hyperlipidemia, unspecified hyperlipidemia type  -     CBC; Future  -     Comprehensive Metabolic Panel, Fasting; Future  -     Lipid Panel; Future  Asymptomatic varicose veins of both lower extremities  Spinal stenosis of lumbar region without neurogenic claudication  Initial Medicare annual wellness visit    Recommendations for Preventive Services Due: see orders and patient instructions/AVS.  Recommended screening schedule for the next 5-10 years is provided to the patient in written form: see Patient Instructions/AVS.     Return for Medicare Annual Wellness Visit in 1 year. Subjective   Patient here for Annual Medicare Wellness Visit. He underwent laparoscopic left inguinal hernia repair 2/1/2022 which was uneventful. He is concerned as he still has a lump there. He was seen postoperatively by the surgeon and informed this was normal.  He is concerned about a recurrent hernia. Patient's complete Health Risk Assessment and screening values have been reviewed and are found in Flowsheets. The following problems were reviewed today and where indicated follow up appointments were made and/or referrals ordered.     Positive Risk Factor Screenings with Interventions:             General Health and ACP:  General  In general, how would you say your health is?: Good  In the past 7 days, have you experienced any of the following: New or Increased Pain, New or Increased Fatigue, Loneliness, Social Isolation, Stress or Anger?: No  Do you get the social and emotional support that you need?: Yes  Do you have a Living Will?: (!) No    Advance Directives       Power of [facility] Will ACP-Advance Directive ACP-Power of     Not on File Not on File Not on File Not on File          General Health Risk Interventions:  No current interventions recommended at this time. Health Habits/Nutrition:  Physical Activity: Inactive    Days of Exercise per Week: 0 days    Minutes of Exercise per Session: 0 min     Have you lost any weight without trying in the past 3 months?: No  Body mass index: (!) 30.82  Have you seen the dentist within the past year?: Yes  Health Habits/Nutrition Interventions:  Inadequate physical activity:  patient is not ready to increase his/her physical activity level at this time    Hearing/Vision:  Do you or your family notice any trouble with your hearing that hasn't been managed with hearing aids?: (!) Yes (has hearing aids but makes ears itch)  Do you have difficulty driving, watching TV, or doing any of your daily activities because of your eyesight?: No  Have you had an eye exam within the past year?: Yes  No results found. Hearing/Vision Interventions:  Hearing concerns:  patient declines any further evaluation/treatment for hearing issues            Objective   Vitals:    07/18/22 1031   BP: 134/72   Pulse: 71   Temp: 97.3 °F (36.3 °C)   TempSrc: Temporal   SpO2: 95%   Weight: 191 lb (86.6 kg)   Height: 5' 6\" (1.676 m)      Body mass index is 30.83 kg/m². Skin: warm and dry, no rash or erythema  Head: normocephalic and atraumatic  Eyes: pupils equal, round, and reactive to light, extraocular eye movements intact, conjunctivae normal and evidence of previous cataract surgery with IOL implants OU. ENT: tympanic membrane, external ear and ear canal normal bilaterally, oropharynx clear and moist with normal mucous membranes and hearing abnormal-evidence of pigment to normal conversation.   Neck: neck supple and non tender without mass, no thyromegaly or thyroid nodules, no cervical lymphadenopathy   Pulmonary/Chest: clear to auscultation bilaterally- no wheezes, rales or rhonchi, normal air movement, no respiratory distress  Cardiovascular: normal rate, regular rhythm, normal S1 and S2, no murmurs, rubs, clicks or gallops, distal pulses intact, no carotid bruits  Abdomen: soft, non-tender, non-distended, normal bowel sounds, no masses or organomegaly and there is evidence of induration in the left groin. This likely is secondary to his recent hernia repair. There is no evidence of recurrent inguinal hernias bilaterally. Extremities: no cyanosis, clubbing or edema and varicose veins noted-  bilateral lower extremities  Neurologic: reflexes normal and symmetric, no cranial nerve deficit, gait, coordination and speech normal       Allergies   Allergen Reactions    Amoxil [Amoxicillin]      Urine Retention  Pt unsure of allergy     Prior to Visit Medications    Medication Sig Taking? Authorizing Provider   tamsulosin (FLOMAX) 0.4 MG capsule TAKE 1 CAPSULE BY MOUTH EVERY DAY Yes Ike Davison, DO   saline nasal gel (AYR) GEL by Nasal route in the morning and at bedtime Yes OLIVIER Palacio CNP   sodium chloride (ALTAMIST SPRAY) 0.65 % nasal spray 2 sprays by Nasal route 4 times daily Yes OLIVIER Palacio CNP   hydrocortisone 2.5 % cream Apply topically as needed  Yes Historical Provider, MD   Multiple Vitamins-Minerals (THERAPEUTIC MULTIVITAMIN-MINERALS) tablet Take 1 tablet by mouth daily Yes Historical Provider, MD     PLAN:  No specific treatment for the induration left groin. Of treatment for varicose veins, however he remains asymptomatic and declines further intervention. Labs drawn. Prevnar 13 vaccination administered.     CareTeam (Including outside providers/suppliers regularly involved in providing care):   Patient Care Team:  Abimbola Valenzuela DO as PCP - General (Family Medicine)  Abimbola Valenzuela DO as PCP - [facility] Empaneled Provider     Reviewed and updated this visit:  Tobacco  Allergies  Meds  Problems  Med Hx  Surg Hx  Soc Hx Fam Hx

## 2022-07-27 NOTE — PATIENT INSTRUCTIONS
Personalized Preventive Plan for Amrit Antonio - 7/18/2022  Medicare offers a range of preventive health benefits. Some of the tests and screenings are paid in full while other may be subject to a deductible, co-insurance, and/or copay. Some of these benefits include a comprehensive review of your medical history including lifestyle, illnesses that may run in your family, and various assessments and screenings as appropriate. After reviewing your medical record and screening and assessments performed today your provider may have ordered immunizations, labs, imaging, and/or referrals for you. A list of these orders (if applicable) as well as your Preventive Care list are included within your After Visit Summary for your review. Other Preventive Recommendations:    A preventive eye exam performed by an eye specialist is recommended every 1-2 years to screen for glaucoma; cataracts, macular degeneration, and other eye disorders. A preventive dental visit is recommended every 6 months. Try to get at least 150 minutes of exercise per week or 10,000 steps per day on a pedometer . Order or download the FREE \"Exercise & Physical Activity: Your Everyday Guide\" from The "[employer]. Call 8-815.374.4570 or search The [employer] online. You need 2297-6752 mg of calcium and 9802-1032 IU of vitamin D per day. It is possible to meet your calcium requirement with diet alone, but a vitamin D supplement is usually necessary to meet this goal.  When exposed to the sun, use a sunscreen that protects against both UVA and UVB radiation with an SPF of 30 or greater. Reapply every 2 to 3 hours or after sweating, drying off with a towel, or swimming. Always wear a seat belt when traveling in a car. Always wear a helmet when riding a bicycle or motorcycle.

## 2022-09-13 RX ORDER — TAMSULOSIN HYDROCHLORIDE 0.4 MG/1
CAPSULE ORAL
Qty: 90 CAPSULE | Refills: 3 | Status: SHIPPED | OUTPATIENT
Start: 2022-09-13

## 2022-09-13 NOTE — TELEPHONE ENCOUNTER
Patient requesting refill      Tamsulosin 0.4 mg #90  Si qd  (Capsules)    Patient asking for refills too, please        PLEASE WRITE RX DO NOT SEND TO [facility]

## 2023-01-23 ENCOUNTER — OFFICE VISIT (OUTPATIENT)
Dept: PRIMARY CARE CLINIC | Age: 88
End: 2023-01-23
Payer: MEDICARE

## 2023-01-23 VITALS
OXYGEN SATURATION: 98 % | HEART RATE: 80 BPM | WEIGHT: 197.4 LBS | DIASTOLIC BLOOD PRESSURE: 80 MMHG | SYSTOLIC BLOOD PRESSURE: 128 MMHG | BODY MASS INDEX: 31.72 KG/M2 | HEIGHT: 66 IN | TEMPERATURE: 97.3 F

## 2023-01-23 DIAGNOSIS — M25.551 RIGHT HIP PAIN: ICD-10-CM

## 2023-01-23 DIAGNOSIS — M48.061 SPINAL STENOSIS OF LUMBAR REGION WITHOUT NEUROGENIC CLAUDICATION: Primary | ICD-10-CM

## 2023-01-23 PROCEDURE — G8427 DOCREV CUR MEDS BY ELIG CLIN: HCPCS

## 2023-01-23 PROCEDURE — 99213 OFFICE O/P EST LOW 20 MIN: CPT

## 2023-01-23 PROCEDURE — 1036F TOBACCO NON-USER: CPT

## 2023-01-23 PROCEDURE — G8417 CALC BMI ABV UP PARAM F/U: HCPCS

## 2023-01-23 PROCEDURE — 1123F ACP DISCUSS/DSCN MKR DOCD: CPT

## 2023-01-23 PROCEDURE — G8484 FLU IMMUNIZE NO ADMIN: HCPCS

## 2023-01-23 RX ORDER — METHYLPREDNISOLONE 4 MG/1
TABLET ORAL
Qty: 1 KIT | Refills: 0 | Status: SHIPPED | OUTPATIENT
Start: 2023-01-23 | End: 2023-01-29

## 2023-01-23 ASSESSMENT — PATIENT HEALTH QUESTIONNAIRE - PHQ9
SUM OF ALL RESPONSES TO PHQ QUESTIONS 1-9: 0
2. FEELING DOWN, DEPRESSED OR HOPELESS: 0
SUM OF ALL RESPONSES TO PHQ QUESTIONS 1-9: 0
SUM OF ALL RESPONSES TO PHQ QUESTIONS 1-9: 0
1. LITTLE INTEREST OR PLEASURE IN DOING THINGS: 0
SUM OF ALL RESPONSES TO PHQ QUESTIONS 1-9: 0
SUM OF ALL RESPONSES TO PHQ9 QUESTIONS 1 & 2: 0

## 2023-01-23 NOTE — PROGRESS NOTES
Subjective:  81 y.o. male who presents to the office today with chief complaint:  Chief Complaint   Patient presents with    Groin Pain     Right started 3 weeks ago & is unbearable. Trouble lifting right leg. Left side started about a year ago, but comes & goes. Hand Pain     Left hand numbness 24 hrs a day for the last 8 month. Patient here with complaints of right-sided hip pain. Patient states this started around 3 weeks ago and has progressively gotten worse. Denies any injury, trauma, or falls. No change to bowel and bladder habits. Denies abdominal pain. Using over-the-counter treatment with limited success. 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 Health Maintenance Due   Topic Date Due    Shingles vaccine (1 of 2) Never done       Cancer History:  Family Cancer History:    Review of Systems    Review of Systems: All bolded are positive, all others are negative. General:  Fever, chills, diaphoresis, fatigue, malaise, night sweats, weight loss  Psychological:  Anxiety, disorientation, hallucinations. ENT:  Epistaxis, headaches, vertigo, visual changes. Cardiovascular:  Chest pain, irregular heartbeats, palpitations, paroxysmal nocturnal dyspnea. Respiratory:  Shortness of breath, coughing, sputum production, hemoptysis, wheezing, orthopnea.   Gastrointestinal:  Nausea, vomiting, diarrhea, heartburn, constipation, abdominal pain, hematemesis, hematochezia, melena, acholic stools  Genito-Urinary:  Dysuria, urgency, frequency, hematuria  Musculoskeletal:  R hip-Joint pain, joint stiffness, joint swelling, muscle pain  Neurology:  Headache, focal neurological deficits, weakness, numbness, paresthesia  Derm:  Rashes, ulcers, excoriations, bruising  Extremities:  Decreased ROM, peripheral edema, mottling      Objective:  Vitals:    01/23/23 1405   BP: 128/80   Pulse: 80   Temp: 97.3 °F (36.3 °C)   SpO2: 98%     Physical Exam  Vitals and nursing note reviewed. Constitutional:       General: He is not in acute distress. Appearance: Normal appearance. He is not ill-appearing. HENT:      Head: Normocephalic and atraumatic. Right Ear: External ear normal.      Left Ear: External ear normal.      Nose: Nose normal.   Eyes:      Extraocular Movements: Extraocular movements intact. Conjunctiva/sclera: Conjunctivae normal.   Cardiovascular:      Rate and Rhythm: Normal rate. Pulmonary:      Effort: Pulmonary effort is normal.   Abdominal:      Comments: No TTP. BS+   Musculoskeletal:      Right hip: Tenderness present. Decreased range of motion. Comments: SLR (+) R side. Discomfort with ROM. LE strength 5/5. Slow transitional movements noted. Neurological:      Mental Status: He is alert.          Results for orders placed or performed in visit on 07/18/22   Lipid Panel   Result Value Ref Range    Cholesterol, Total 165 0 - 199 mg/dL    Triglycerides 58 0 - 149 mg/dL    HDL 57 >40 mg/dL    LDL Calculated 96 0 - 99 mg/dL    VLDL Cholesterol Calculated 12 mg/dL   Comprehensive Metabolic Panel, Fasting   Result Value Ref Range    Sodium 145 132 - 146 mmol/L    Potassium 4.7 3.5 - 5.0 mmol/L    Chloride 109 (H) 98 - 107 mmol/L    CO2 18 (L) 22 - 29 mmol/L    Anion Gap 18 (H) 7 - 16 mmol/L    Glucose, Fasting 82 74 - 99 mg/dL    BUN 16 6 - 23 mg/dL    Creatinine 1.1 0.7 - 1.2 mg/dL    GFR Non-African American >60 >=60 mL/min/1.73    GFR African American >60     Calcium 9.3 8.6 - 10.2 mg/dL    Total Protein 7.5 6.4 - 8.3 g/dL    Albumin 4.2 3.5 - 5.2 g/dL    Total Bilirubin 0.8 0.0 - 1.2 mg/dL    Alkaline Phosphatase 75 40 - 129 U/L    ALT 11 0 - 40 U/L    AST 21 0 - 39 U/L   CBC   Result Value Ref Range    WBC 6.0 4.5 - 11.5 E9/L    RBC 4.34 3.80 - 5.80 E12/L    Hemoglobin 13.6 12.5 - 16.5 g/dL    Hematocrit 41.8 37.0 - 54.0 %    MCV 96.3 80.0 - 99.9 fL    MCH 31.3 26.0 - 35.0 pg    MCHC 32.5 32.0 - 34.5 %    RDW 13.7 11.5 - 15.0 fL    Platelets 393 592 - 444 E9/L    MPV 10.5 7.0 - 12.0 fL         Assessment and Plan:  Nick Moore was seen today for groin pain and hand pain. Diagnoses and all orders for this visit:    Spinal stenosis of lumbar region without neurogenic claudication  -     methylPREDNISolone (MEDROL DOSEPACK) 4 MG tablet; Take by mouth. Right hip pain  -     methylPREDNISolone (MEDROL DOSEPACK) 4 MG tablet; Take by mouth. Highly advised patient to consider physical therapy. States he is interested in this but needs to think about it. Adds he will contact the office if he would like a physical therapy referral placed. Will attempt Medrol Dosepak. Advised patient to continue with over-the-counter therapy as needed. No follow-ups on file. Patient may come in sooner if needed for medical concerns. Patient advised to call at any time to cancel, re-schedule, or for any questions/concerns.             Jennifer Yu PA-C    1/23/23  8:34 AM

## 2023-02-07 ENCOUNTER — OFFICE VISIT (OUTPATIENT)
Dept: PRIMARY CARE CLINIC | Age: 88
End: 2023-02-07

## 2023-02-07 VITALS
TEMPERATURE: 97.4 F | RESPIRATION RATE: 20 BRPM | WEIGHT: 196 LBS | HEART RATE: 92 BPM | HEIGHT: 66 IN | OXYGEN SATURATION: 92 % | DIASTOLIC BLOOD PRESSURE: 78 MMHG | SYSTOLIC BLOOD PRESSURE: 130 MMHG | BODY MASS INDEX: 31.5 KG/M2

## 2023-02-07 DIAGNOSIS — M25.551 RIGHT HIP PAIN: Primary | ICD-10-CM

## 2023-02-07 SDOH — ECONOMIC STABILITY: FOOD INSECURITY: WITHIN THE PAST 12 MONTHS, YOU WORRIED THAT YOUR FOOD WOULD RUN OUT BEFORE YOU GOT MONEY TO BUY MORE.: NEVER TRUE

## 2023-02-07 SDOH — ECONOMIC STABILITY: INCOME INSECURITY: HOW HARD IS IT FOR YOU TO PAY FOR THE VERY BASICS LIKE FOOD, HOUSING, MEDICAL CARE, AND HEATING?: NOT HARD AT ALL

## 2023-02-07 SDOH — ECONOMIC STABILITY: HOUSING INSECURITY
IN THE LAST 12 MONTHS, WAS THERE A TIME WHEN YOU DID NOT HAVE A STEADY PLACE TO SLEEP OR SLEPT IN A SHELTER (INCLUDING NOW)?: NO

## 2023-02-07 SDOH — ECONOMIC STABILITY: FOOD INSECURITY: WITHIN THE PAST 12 MONTHS, THE FOOD YOU BOUGHT JUST DIDN'T LAST AND YOU DIDN'T HAVE MONEY TO GET MORE.: NEVER TRUE

## 2023-02-07 NOTE — PROGRESS NOTES
Subjective:  81 y.o. male who presents to the office today with chief complaint:  Chief Complaint   Patient presents with    Groin Pain     90% right groin. 10% hip pain right side. Cant sleep at night due to this. Medrol dose helped for 5 days, but, pain is better. R Groin pain: Ongoing for 6 wks. Medrol dose pack prescribed only lasted 4-5 days. Pain is severe. Does not feel it is a hernia. Having difficulty w sleeping d/t aching in his LE. Pain w raising LE, walking. Has felt/heard a \"snap\" in his R groin as well. Health Maintenance Due   Topic Date Due    Shingles vaccine (1 of 2) Never done         Review of Systems    Review of Systems: All bolded are positive, all others are negative. General:  Fever, chills, diaphoresis, fatigue, malaise, night sweats, weight loss  Psychological:  Anxiety, disorientation, hallucinations. ENT:  Epistaxis, headaches, vertigo, visual changes. Cardiovascular:  Chest pain, irregular heartbeats, palpitations, paroxysmal nocturnal dyspnea. Respiratory:  Shortness of breath, coughing, sputum production, hemoptysis, wheezing, orthopnea. Gastrointestinal:  Nausea, vomiting, diarrhea, heartburn, constipation, abdominal pain, hematemesis, hematochezia, melena, acholic stools  Genito-Urinary:  Dysuria, urgency, frequency, hematuria  Musculoskeletal:  Joint pain, joint stiffness, joint swelling, muscle pain  Neurology:  Headache, focal neurological deficits, weakness, numbness, paresthesia  Derm:  Rashes, ulcers, excoriations, bruising  Extremities:  Decreased ROM, peripheral edema, mottling      Objective:  Vitals:    02/07/23 1124   BP: 130/78   Pulse: 92   Resp: 20   Temp: 97.4 °F (36.3 °C)   SpO2: 92%     Physical Exam  Constitutional:       General: He is not in acute distress. Appearance: He is obese. He is not toxic-appearing or diaphoretic. Abdominal:      General: Abdomen is flat. Bowel sounds are normal.      Hernia: No hernia is present.    Musculoskeletal: Comments:  Stands and walks w trunk flexed posture. Hip ROM is full outside of extension, which is limited to neutral. Severe pain w R hip flexion- limits strength. 5/5 strength other planes R hip. ROM lumbar spine and R knee full. Tenderness to palpate R iliopsoas. Neurological:      Mental Status: He is alert and oriented to person, place, and time. Psychiatric:         Mood and Affect: Mood normal.         Behavior: Behavior normal.         Thought Content: Thought content normal.         Results for orders placed or performed in visit on 07/18/22   Lipid Panel   Result Value Ref Range    Cholesterol, Total 165 0 - 199 mg/dL    Triglycerides 58 0 - 149 mg/dL    HDL 57 >40 mg/dL    LDL Calculated 96 0 - 99 mg/dL    VLDL Cholesterol Calculated 12 mg/dL   Comprehensive Metabolic Panel, Fasting   Result Value Ref Range    Sodium 145 132 - 146 mmol/L    Potassium 4.7 3.5 - 5.0 mmol/L    Chloride 109 (H) 98 - 107 mmol/L    CO2 18 (L) 22 - 29 mmol/L    Anion Gap 18 (H) 7 - 16 mmol/L    Glucose, Fasting 82 74 - 99 mg/dL    BUN 16 6 - 23 mg/dL    Creatinine 1.1 0.7 - 1.2 mg/dL    GFR Non-African American >60 >=60 mL/min/1.73    GFR African American >60     Calcium 9.3 8.6 - 10.2 mg/dL    Total Protein 7.5 6.4 - 8.3 g/dL    Albumin 4.2 3.5 - 5.2 g/dL    Total Bilirubin 0.8 0.0 - 1.2 mg/dL    Alkaline Phosphatase 75 40 - 129 U/L    ALT 11 0 - 40 U/L    AST 21 0 - 39 U/L   CBC   Result Value Ref Range    WBC 6.0 4.5 - 11.5 E9/L    RBC 4.34 3.80 - 5.80 E12/L    Hemoglobin 13.6 12.5 - 16.5 g/dL    Hematocrit 41.8 37.0 - 54.0 %    MCV 96.3 80.0 - 99.9 fL    MCH 31.3 26.0 - 35.0 pg    MCHC 32.5 32.0 - 34.5 %    RDW 13.7 11.5 - 15.0 fL    Platelets 947 090 - 592 E9/L    MPV 10.5 7.0 - 12.0 fL         Assessment and Plan:  Aaron Nj was seen today for groin pain. Diagnoses and all orders for this visit:    Right hip pain  -     XR HIP 2-3 VW W PELVIS RIGHT;  Future      R hip pain: Hip flexor strain vs lumbar spine stenosis vs hip OA vs R inguinal hernia. X-ray will be completed. Pt refuses referral to therapy. Will take tylenol 500mg and motrin 200mg combo TID prn. Consider further imaging or referral if no improvement at visit in 2 wks. Return in about 2 weeks (around 2/21/2023) for R hip pain. Patient may come in sooner if needed for medical concerns. Patient advised to call at any time to cancel, re-schedule, or for any questions/concerns.             Denice Davison,     2/7/23  12:55 PM

## 2023-02-09 DIAGNOSIS — M25.551 RIGHT HIP PAIN: ICD-10-CM

## 2023-02-21 ENCOUNTER — TELEPHONE (OUTPATIENT)
Dept: PRIMARY CARE CLINIC | Age: 88
End: 2023-02-21

## 2023-02-21 ENCOUNTER — OFFICE VISIT (OUTPATIENT)
Dept: PRIMARY CARE CLINIC | Age: 88
End: 2023-02-21

## 2023-02-21 VITALS
HEIGHT: 66 IN | TEMPERATURE: 97.3 F | WEIGHT: 197 LBS | DIASTOLIC BLOOD PRESSURE: 70 MMHG | BODY MASS INDEX: 31.66 KG/M2 | OXYGEN SATURATION: 95 % | SYSTOLIC BLOOD PRESSURE: 118 MMHG | HEART RATE: 76 BPM

## 2023-02-21 DIAGNOSIS — M54.50 ACUTE RIGHT-SIDED LOW BACK PAIN WITHOUT SCIATICA: Primary | ICD-10-CM

## 2023-02-21 LAB
BILIRUBIN, POC: NEGATIVE
BLOOD URINE, POC: NEGATIVE
CLARITY, POC: CLEAR
COLOR, POC: YELLOW
GLUCOSE URINE, POC: NEGATIVE
KETONES, POC: NEGATIVE
LEUKOCYTE EST, POC: NEGATIVE
NITRITE, POC: NEGATIVE
PH, POC: 6.5
PROTEIN, POC: NEGATIVE
SPECIFIC GRAVITY, POC: 1.02
UROBILINOGEN, POC: 0.2

## 2023-02-21 NOTE — TELEPHONE ENCOUNTER
PC to patient to follow up with his question post-visit today    Clarified with patient that it was a Lidoderm (lidocaine) that is recommended for him    Left VM for patient, informed to call the office if he had any questions

## 2023-02-21 NOTE — PROGRESS NOTES
Subjective:  81 y.o. male who presents to the office today with chief complaint:  Chief Complaint   Patient presents with    2 Week Follow-Up     Right hip pain. No improvement in pain since last visit. Feels the pain medication is making his right kidney hurt. R hip pain: States that the pain is better. No pain w walking. There is pain w flexion of the R hip. Has taken ibuprofen and tylenol. Pain is \"under control\" at this point. \"Kidney pain\": Right side pain. Increase in nocturia frequency. Pain is the worst when he goes to bed and lies down- will resolve after a few moments. Does not bother him w standing or walking. Significant pain in that area at night when he wakes to urinate. Health Maintenance Due   Topic Date Due    Shingles vaccine (1 of 2) Never done       Cancer History:  Family Cancer History:    Review of Systems    Review of Systems: All bolded are positive, all others are negative. General:  Fever, chills, diaphoresis, fatigue, malaise, night sweats, weight loss  Psychological:  Anxiety, disorientation, hallucinations. ENT:  Epistaxis, headaches, vertigo, visual changes. Cardiovascular:  Chest pain, irregular heartbeats, palpitations, paroxysmal nocturnal dyspnea. Respiratory:  Shortness of breath, coughing, sputum production, hemoptysis, wheezing, orthopnea.   Gastrointestinal:  Nausea, vomiting, diarrhea, heartburn, constipation, abdominal pain, hematemesis, hematochezia, melena, acholic stools  Genito-Urinary:  Dysuria, urgency, frequency, hematuria  Musculoskeletal:  Joint pain, joint stiffness, joint swelling, muscle pain  Neurology:  Headache, focal neurological deficits, weakness, numbness, paresthesia  Derm:  Rashes, ulcers, excoriations, bruising  Extremities:  Decreased ROM, peripheral edema, mottling      Objective:  Vitals:    02/21/23 0959   BP: 118/70   Pulse: 76   Temp: 97.3 °F (36.3 °C)   SpO2: 95%     Physical Exam  Constitutional:       General: He is not in acute distress. Appearance: He is well-developed. He is not diaphoretic. HENT:      Head: Normocephalic and atraumatic. Right Ear: External ear normal.      Left Ear: External ear normal.      Nose: Nose normal.      Mouth/Throat:      Pharynx: No oropharyngeal exudate. Eyes:      General:         Right eye: No discharge. Left eye: No discharge. Conjunctiva/sclera: Conjunctivae normal.      Pupils: Pupils are equal, round, and reactive to light. Cardiovascular:      Rate and Rhythm: Normal rate and regular rhythm. Abdominal:      Tenderness: There is no right CVA tenderness or left CVA tenderness. Musculoskeletal:      Cervical back: Normal range of motion and neck supple. Comments: Pain w hip flexion B. Stands w forward flexed posture   Lymphadenopathy:      Cervical: No cervical adenopathy. Skin:     General: Skin is warm. Neurological:      Mental Status: He is alert and oriented to person, place, and time. Psychiatric:         Mood and Affect: Mood normal.         Behavior: Behavior normal.         Thought Content: Thought content normal.         Judgment: Judgment normal.         Results for orders placed or performed in visit on 02/21/23   POCT Urinalysis no Micro   Result Value Ref Range    Color, UA Yellow     Clarity, UA Clear     Glucose, UA POC Negative     Bilirubin, UA Negative     Ketones, UA Negative     Spec Grav, UA 1.020     Blood, UA POC Negative     pH, UA 6.5     Protein, UA POC Negative     Urobilinogen, UA 0.2     Leukocytes, UA Negative     Nitrite, UA Negative          Assessment and Plan:  Belton Closs was seen today for 2 week follow-up. Diagnoses and all orders for this visit:    Acute right-sided low back pain without sciatica  -     XR LUMBAR SPINE (2-3 VIEWS); Future  -     POCT Urinalysis no Micro      Right hip pain: Some improvement in his symptoms.   Discussed other medications to cover pain such as gabapentin, however, there is a concern for increasing risk of falls while taking this. He agrees to purchase Lidoderm patches for on his back to wear as needed, and to take NSAIDs as needed. He prefers not to be referred to orthopedics at this time. Consider referral to pain management if no improvement at next visit. Patient refuses physical therapy at this time. Acute right-sided low back pain: X-ray will be completed. Concern for DJD. Area of pain not consistent with where renal pain would be. No flank pain. UA was negative    Return in about 4 weeks (around 3/21/2023) for back and hip pain. Patient may come in sooner if needed for medical concerns. Patient advised to call at any time to cancel, re-schedule, or for any questions/concerns.             Maninder Davison,     2/21/23  11:58 AM

## 2023-02-23 DIAGNOSIS — M54.50 ACUTE RIGHT-SIDED LOW BACK PAIN WITHOUT SCIATICA: ICD-10-CM

## 2023-03-20 ENCOUNTER — OFFICE VISIT (OUTPATIENT)
Dept: PRIMARY CARE CLINIC | Age: 88
End: 2023-03-20
Payer: MEDICARE

## 2023-03-20 VITALS
TEMPERATURE: 97.3 F | OXYGEN SATURATION: 94 % | BODY MASS INDEX: 30.7 KG/M2 | RESPIRATION RATE: 20 BRPM | DIASTOLIC BLOOD PRESSURE: 68 MMHG | WEIGHT: 191 LBS | HEART RATE: 63 BPM | SYSTOLIC BLOOD PRESSURE: 134 MMHG | HEIGHT: 66 IN

## 2023-03-20 DIAGNOSIS — M54.50 ACUTE RIGHT-SIDED LOW BACK PAIN WITHOUT SCIATICA: ICD-10-CM

## 2023-03-20 DIAGNOSIS — R10.9 FLANK PAIN: Primary | ICD-10-CM

## 2023-03-20 PROCEDURE — G8484 FLU IMMUNIZE NO ADMIN: HCPCS | Performed by: FAMILY MEDICINE

## 2023-03-20 PROCEDURE — G8427 DOCREV CUR MEDS BY ELIG CLIN: HCPCS | Performed by: FAMILY MEDICINE

## 2023-03-20 PROCEDURE — 1036F TOBACCO NON-USER: CPT | Performed by: FAMILY MEDICINE

## 2023-03-20 PROCEDURE — 1123F ACP DISCUSS/DSCN MKR DOCD: CPT | Performed by: FAMILY MEDICINE

## 2023-03-20 PROCEDURE — G8417 CALC BMI ABV UP PARAM F/U: HCPCS | Performed by: FAMILY MEDICINE

## 2023-03-20 PROCEDURE — 99213 OFFICE O/P EST LOW 20 MIN: CPT | Performed by: FAMILY MEDICINE

## 2023-03-20 NOTE — PROGRESS NOTES
sciatica  -     External Referral To Physical Therapy      Right hip pain: Resolved. Acute right-sided low back pain: KUB ordered to rule out any nephrolithiasis. Referral will be sent to physical therapy. If no improvement, referral will be sent to Dr. Ari Landeros with pain management. Return in about 4 weeks (around 4/17/2023) for back pain. Patient may come in sooner if needed for medical concerns. Patient advised to call at any time to cancel, re-schedule, or for any questions/concerns.             Suzen Osgood [place],     2/21/23  2:10 PM

## 2023-05-02 DIAGNOSIS — R53.82 CHRONIC FATIGUE: ICD-10-CM

## 2023-05-02 DIAGNOSIS — E78.5 HYPERLIPIDEMIA, UNSPECIFIED HYPERLIPIDEMIA TYPE: Primary | ICD-10-CM

## 2023-05-04 LAB
ALBUMIN SERPL-MCNC: NORMAL G/DL
ALP BLD-CCNC: NORMAL U/L
ALT SERPL-CCNC: NORMAL U/L
AST SERPL-CCNC: NORMAL U/L
BASOPHILS ABSOLUTE: NORMAL
BASOPHILS RELATIVE PERCENT: NORMAL
BILIRUB SERPL-MCNC: NORMAL MG/DL
BUN BLDV-MCNC: NORMAL MG/DL
CALCIUM SERPL-MCNC: NORMAL MG/DL
CHLORIDE BLD-SCNC: NORMAL MMOL/L
CHOLESTEROL, TOTAL: NORMAL
CHOLESTEROL/HDL RATIO: NORMAL
CO2: NORMAL
CREAT SERPL-MCNC: NORMAL MG/DL
EOSINOPHILS ABSOLUTE: NORMAL
EOSINOPHILS RELATIVE PERCENT: NORMAL
GLUCOSE FASTING: NORMAL
HCT VFR BLD CALC: NORMAL %
HDLC SERPL-MCNC: NORMAL MG/DL
HEMOGLOBIN: NORMAL
LDL CHOLESTEROL CALCULATED: NORMAL
LYMPHOCYTES ABSOLUTE: NORMAL
LYMPHOCYTES RELATIVE PERCENT: NORMAL
MCH RBC QN AUTO: NORMAL PG
MCHC RBC AUTO-ENTMCNC: NORMAL G/DL
MCV RBC AUTO: NORMAL FL
MONOCYTES ABSOLUTE: NORMAL
MONOCYTES RELATIVE PERCENT: NORMAL
NEUTROPHILS ABSOLUTE: NORMAL
NEUTROPHILS RELATIVE PERCENT: NORMAL
NONHDLC SERPL-MCNC: NORMAL MG/DL
PLATELET # BLD: NORMAL 10*3/UL
PMV BLD AUTO: NORMAL FL
POTASSIUM SERPL-SCNC: NORMAL MMOL/L
RBC # BLD: NORMAL 10*6/UL
SODIUM BLD-SCNC: NORMAL MMOL/L
TOTAL PROTEIN: NORMAL
TRIGL SERPL-MCNC: NORMAL MG/DL
TSH SERPL DL<=0.05 MIU/L-ACNC: NORMAL M[IU]/L
VLDLC SERPL CALC-MCNC: NORMAL MG/DL
WBC # BLD: NORMAL 10*3/UL

## 2023-05-06 DIAGNOSIS — E03.9 ACQUIRED HYPOTHYROIDISM: Primary | ICD-10-CM

## 2023-05-06 RX ORDER — LEVOTHYROXINE SODIUM 0.03 MG/1
25 TABLET ORAL DAILY
Qty: 90 TABLET | Refills: 1 | Status: SHIPPED | OUTPATIENT
Start: 2023-05-06

## 2023-05-08 ENCOUNTER — TELEPHONE (OUTPATIENT)
Dept: PRIMARY CARE CLINIC | Age: 88
End: 2023-05-08

## 2023-05-08 NOTE — TELEPHONE ENCOUNTER
----- Message from Ashok Mejia DO sent at 5/6/2023 11:47 AM EDT -----  Please call. [phone]. He appears to have an underactive thyroid. We will institute Synthroid 25 mcg daily. Prescription will be sent to the pharmacy. Recheck TSH only in 6 weeks.

## 2023-05-09 DIAGNOSIS — E78.5 HYPERLIPIDEMIA, UNSPECIFIED HYPERLIPIDEMIA TYPE: ICD-10-CM

## 2023-05-09 DIAGNOSIS — R53.82 CHRONIC FATIGUE: ICD-10-CM

## 2023-05-26 ENCOUNTER — OFFICE VISIT (OUTPATIENT)
Dept: PRIMARY CARE CLINIC | Age: 88
End: 2023-05-26

## 2023-05-26 VITALS
DIASTOLIC BLOOD PRESSURE: 68 MMHG | OXYGEN SATURATION: 96 % | SYSTOLIC BLOOD PRESSURE: 128 MMHG | TEMPERATURE: 97 F | BODY MASS INDEX: 30.62 KG/M2 | HEART RATE: 66 BPM | WEIGHT: 190.5 LBS | HEIGHT: 66 IN

## 2023-05-26 DIAGNOSIS — G56.02 LEFT CARPAL TUNNEL SYNDROME: Primary | ICD-10-CM

## 2023-05-26 RX ORDER — METHYLPREDNISOLONE 4 MG/1
TABLET ORAL
Qty: 1 KIT | Refills: 0 | Status: SHIPPED | OUTPATIENT
Start: 2023-05-26 | End: 2023-06-01

## 2023-05-26 ASSESSMENT — ENCOUNTER SYMPTOMS
RHINORRHEA: 0
ABDOMINAL PAIN: 0
BACK PAIN: 0
PHOTOPHOBIA: 0
WHEEZING: 0
SHORTNESS OF BREATH: 0
VOMITING: 0
DIARRHEA: 0
CONSTIPATION: 0
COUGH: 0
SORE THROAT: 0

## 2023-05-26 NOTE — PROGRESS NOTES
Subjective:  81 y.o. male who presents to the office today with chief complaint:  Chief Complaint   Patient presents with    Swelling     Left hand swollen for over a month now. Thumb, pointer finger, & middle finger are always numb. Pulsates at night. Patient here with complaints of numbness/tingling in left hand. States left hand has been swollen intermittently. Digits 1, 2, and 3 affected the most.  States symptoms are worse at night. Describes it as a pulsating sensation. Health Maintenance Due   Topic Date Due    Shingles vaccine (1 of 2) Never done         Review of Systems   Constitutional:  Negative for appetite change, chills, diaphoresis, fatigue and fever. HENT:  Negative for congestion, hearing loss, rhinorrhea and sore throat. Eyes:  Negative for photophobia and visual disturbance. Respiratory:  Negative for cough, shortness of breath and wheezing. Cardiovascular:  Negative for chest pain and palpitations. Gastrointestinal:  Negative for abdominal pain, constipation, diarrhea and vomiting. Musculoskeletal:  Negative for arthralgias, back pain, neck pain and neck stiffness. Skin:  Negative for rash. Neurological:  Positive for numbness. All other systems reviewed and are negative. Objective:  Vitals:    05/26/23 1042   BP: 128/68   Pulse: 66   Temp: 97 °F (36.1 °C)   SpO2: 96%     Physical Exam  Vitals and nursing note reviewed. Constitutional:       Appearance: Normal appearance. He is obese. HENT:      Head: Normocephalic and atraumatic. Right Ear: External ear normal.      Left Ear: External ear normal.      Nose: Nose normal.   Eyes:      Extraocular Movements: Extraocular movements intact. Conjunctiva/sclera: Conjunctivae normal.   Cardiovascular:      Rate and Rhythm: Normal rate. Pulmonary:      Effort: Pulmonary effort is normal.   Musculoskeletal:      Left hand: Swelling present. Normal strength. Normal sensation. Normal capillary refill.

## 2023-06-02 ENCOUNTER — PROCEDURE VISIT (OUTPATIENT)
Dept: PHYSICAL MEDICINE AND REHAB | Age: 88
End: 2023-06-02

## 2023-06-02 VITALS — WEIGHT: 190 LBS | BODY MASS INDEX: 29.82 KG/M2 | HEIGHT: 67 IN

## 2023-06-02 DIAGNOSIS — G56.02 LEFT CARPAL TUNNEL SYNDROME: ICD-10-CM

## 2023-06-02 NOTE — PATIENT INSTRUCTIONS
Electrodiagnotic Laboratory  Accredited by the [facility] with Exemplary status  MYRA Carrington D.O. [facility]  [address] [facility] [place]  Phone: [phone]  Fax: [phone]        Today you had an electrodiagnostic exam which included nerve conduction studies (NCS) and electromyography (EMG). This test evaluated the electrical activity of your nerves and muscles to help determine if you have a nerve or muscle disease. This test can help determine the location and type of a nerve or muscle problem. This will help your referring doctor diagnose your condition and determine the appropriate next step in your treatment plan. After your test:    1. There are no long lasting side effects of the test.     2. You may resume your normal activities without restrictions. 3.  Resume any medications that were stopped for the test.     4  If you have sore areas or bruising in your muscles where the needle was placed, apply a cold pack to the sore area for 15-20 minutes three to four times a day as needed for pain. The soreness should go away in about 1-2 days. 5. Your results were provided  Briefly at the end of your test and the final detailed report will be provided to your referring physician, and/or primary care physician and any other parties you requested within 1-2 days of the examination. You may wish to contact your referring provider after a few days to determine what they would like you to do next. 6.  Please call [phone] with any questions or concerns and if you develop increased body temperature/fever, swelling, tenderness, increased pain and/or drainage from the sites where the needle was placed. Thank you for choosing us for your health care needs.

## 2023-06-02 NOTE — PROGRESS NOTES
Electrodiagnostic Laboratory  *Accredited by the [facility] with exemplary status  [address] [facility] [place]  Phone: [phone]  Fax: [phone]      Date of Examination: 06/02/23    Patient Name: Marieta Fothergill    An independent historian was not needed. Marieta Fothergill  is a 81y.o. year old male who was seen today regarding   Chief Complaint   Patient presents with    Extremity Pain     none    Numbness     Left thumb, index and middle finger numbness. Extremity Weakness     Weakened  strength left hand. .     The symptoms started after no injury. The symptoms are constant. I have reviewed the referring provider's office note. There is not a family history of neuromuscular conditions. Physical Exam: General: The patient is in no apparent distress. MSK: There is no joint effusion, deformity, instability, swelling, erythema or warmth. AROM is full in the spine and extremities. +tinel left wrist. +thenar atrophy. Neurologic:  Left  is weak, otherwise, n focal sensorimotor deficit. Reflexes 2+ and symmetric. Gait is normal.    Impression:     1. Left carpal tunnel syndrome        Plan:   EMG is indicated to evaluate the above diagnosis. No orders of the defined types were placed in this encounter. 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 The patient was educated about the diagnosis and the prognosis. Recommend neutral wrist splints at h.s., OT and/or carpal tunnel injection and if no improvement after 4-6 weeks of conservative treatments consider orthopedic surgery evaluation. Prognosis with surgical intervention is guarded given the severity of the axonal loss that has already occurred. Advised patient to follow up with referring provider. Thank you for allowing me to participate in the care of your patient.       Sincerely,     Lakisha Wallace DO
Medicine      Nerve conduction studies and electromyography were performed according to our laboratory policies and procedures which can be provided upon request. All abnormal values are identified in the table.  Laboratory normal values can also be provided upon request.       Cc: MAGALIE Agrawal, DO

## 2023-06-07 ENCOUNTER — TELEPHONE (OUTPATIENT)
Dept: PRIMARY CARE CLINIC | Age: 88
End: 2023-06-07

## 2023-06-07 NOTE — TELEPHONE ENCOUNTER
----- Message from Graeme Toscano PA-C sent at 6/6/2023 12:49 PM EDT -----  Please let patient know his EMG was consistent with left carpal tunnel. Recommended conservation treatments for 4-6 weeks. Continue with braces at night, can also refer to OT, consider wrist injections. If the above fail then he will require ortho consult. Thank you .  ----- Message -----  From: Joseph Anthony  Sent: 6/2/2023  12:59 PM EDT  To: Graeme Toscano PA-C    Please see the EMG results for your review.

## 2023-06-07 NOTE — TELEPHONE ENCOUNTER
PC to patient to give him the recommendations from Josephine Dupree PA-C, in regards to his EMG. Patient stated that he has an appointment with a hand surgeon at [address] [place] [address] on 6/12/23. Patient is unable to sleep due to the pain. He also stated that he was told that the blockage was bad.

## 2023-07-11 ENCOUNTER — TELEPHONE (OUTPATIENT)
Dept: PRIMARY CARE CLINIC | Age: 88
End: 2023-07-11

## 2023-07-11 NOTE — TELEPHONE ENCOUNTER
PC from patient states Dr. Carmen Chamberlain started him on Thyroid medication in May, asking if that can cause your feet to swell?     Patient states that the swelling just started last week (not 2 months ago)    Patient is concerned because he is set to have carpal tunnel surgery this Friday        Please, advise

## 2023-07-18 ENCOUNTER — OFFICE VISIT (OUTPATIENT)
Dept: PRIMARY CARE CLINIC | Age: 88
End: 2023-07-18
Payer: MEDICARE

## 2023-07-18 VITALS
SYSTOLIC BLOOD PRESSURE: 128 MMHG | HEIGHT: 67 IN | TEMPERATURE: 98.4 F | BODY MASS INDEX: 29.82 KG/M2 | DIASTOLIC BLOOD PRESSURE: 74 MMHG | HEART RATE: 84 BPM | OXYGEN SATURATION: 96 % | WEIGHT: 190 LBS

## 2023-07-18 DIAGNOSIS — E78.5 HYPERLIPIDEMIA, UNSPECIFIED HYPERLIPIDEMIA TYPE: ICD-10-CM

## 2023-07-18 DIAGNOSIS — M25.50 POLYARTHRALGIA: ICD-10-CM

## 2023-07-18 DIAGNOSIS — E03.9 ACQUIRED HYPOTHYROIDISM: Primary | ICD-10-CM

## 2023-07-18 DIAGNOSIS — I83.892 VARICOSE VEINS OF LEFT LOWER EXTREMITY WITH EDEMA: ICD-10-CM

## 2023-07-18 DIAGNOSIS — E03.9 ACQUIRED HYPOTHYROIDISM: ICD-10-CM

## 2023-07-18 DIAGNOSIS — I83.93 VARICOSE VEINS OF BOTH LOWER EXTREMITIES, UNSPECIFIED WHETHER COMPLICATED: ICD-10-CM

## 2023-07-18 PROCEDURE — G8417 CALC BMI ABV UP PARAM F/U: HCPCS | Performed by: FAMILY MEDICINE

## 2023-07-18 PROCEDURE — 36415 COLL VENOUS BLD VENIPUNCTURE: CPT | Performed by: FAMILY MEDICINE

## 2023-07-18 PROCEDURE — 1123F ACP DISCUSS/DSCN MKR DOCD: CPT | Performed by: FAMILY MEDICINE

## 2023-07-18 PROCEDURE — 1036F TOBACCO NON-USER: CPT | Performed by: FAMILY MEDICINE

## 2023-07-18 PROCEDURE — 99214 OFFICE O/P EST MOD 30 MIN: CPT | Performed by: FAMILY MEDICINE

## 2023-07-18 PROCEDURE — G8427 DOCREV CUR MEDS BY ELIG CLIN: HCPCS | Performed by: FAMILY MEDICINE

## 2023-07-18 ASSESSMENT — ENCOUNTER SYMPTOMS
FACIAL SWELLING: 0
WHEEZING: 0
DIARRHEA: 0
PHOTOPHOBIA: 0
EYE ITCHING: 0
SINUS PRESSURE: 0
NAUSEA: 0
EYE PAIN: 0
COUGH: 0
ABDOMINAL PAIN: 0
VOMITING: 0
ABDOMINAL DISTENTION: 0
SHORTNESS OF BREATH: 0
RHINORRHEA: 0
EYE DISCHARGE: 0
SORE THROAT: 0
CONSTIPATION: 0
COLOR CHANGE: 0
BLOOD IN STOOL: 0

## 2023-07-18 NOTE — PROGRESS NOTES
Kassie Rouse (:  1935) is a 81 y.o. male,Established patient, here for evaluation of the following chief complaint(s):  Follow-up and Edema (Swelling leann lower ext past week Denies SOB)         ASSESSMENT/PLAN:  1. Acquired hypothyroidism  -     CBC; Future  -     Comprehensive Metabolic Panel, Fasting; Future  -     TSH; Future  2. Hyperlipidemia, unspecified hyperlipidemia type  -     CBC; Future  -     Comprehensive Metabolic Panel, Fasting; Future  -     Lipid Panel; Future  3. 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 Polyarthralgia  -     Sedimentation Rate; Future  5. Varicose veins of both lower extremities, unspecified whether complicated      PLAN:  Advised knee-high support stockings. Labs drawn. Return in 2 months for recheck TSH. Return in about 2 months (around 2023) for Follow up. Subjective   SUBJECTIVE/OBJECTIVE:  Patient here for routine follow-up and labs. He had a left carpal tunnel repair performed 2023. He complains of swelling bilateral lower extremities left worse than right. Denies any shortness of breath, orthopnea, PND, chest discomfort. He does complain of pain in bilateral shoulders, hips and knees. Describes stiffness. Has some swelling in his hand postoperatively. Review of Systems   Constitutional:  Negative for chills, fatigue and fever. HENT:  Positive for hearing loss (Wears bilateral hearing aids at times. ). Negative for congestion, ear discharge, ear pain, facial swelling, nosebleeds, rhinorrhea, sinus pressure and sore throat. Eyes:  Negative for photophobia, pain, discharge, itching and visual disturbance. Respiratory:  Negative for cough, shortness of breath and wheezing. Cardiovascular:  Positive for leg swelling. Negative for chest pain and palpitations.         Varicose veins bilateral lower extremities   Gastrointestinal:  Negative for abdominal distention, abdominal pain, blood in stool, constipation,

## 2023-07-18 NOTE — PROGRESS NOTES
Venipuncture was obtained from right arm. Patient tolerated the procedure without complications or complaints.

## 2023-07-19 LAB
ALBUMIN SERPL-MCNC: 3.8 G/DL (ref 3.5–5.2)
ALP BLD-CCNC: 80 U/L (ref 40–129)
ALT SERPL-CCNC: 12 U/L (ref 0–40)
ANION GAP SERPL CALCULATED.3IONS-SCNC: 8 MMOL/L (ref 7–16)
AST SERPL-CCNC: 17 U/L (ref 0–39)
BILIRUB SERPL-MCNC: 0.7 MG/DL (ref 0–1.2)
BUN BLDV-MCNC: 14 MG/DL (ref 6–23)
CALCIUM SERPL-MCNC: 9.1 MG/DL (ref 8.6–10.2)
CHLORIDE BLD-SCNC: 107 MMOL/L (ref 98–107)
CHOLESTEROL: 141 MG/DL
CO2: 21 MMOL/L (ref 22–29)
CREAT SERPL-MCNC: 1 MG/DL (ref 0.7–1.2)
GFR SERPL CREATININE-BSD FRML MDRD: >60 ML/MIN/1.73M2
GLUCOSE FASTING: 86 MG/DL (ref 74–99)
HCT VFR BLD CALC: 43.6 % (ref 37–54)
HDLC SERPL-MCNC: 58 MG/DL
HEMOGLOBIN: 13.1 G/DL (ref 12.5–16.5)
LDL CHOLESTEROL: 72 MG/DL
MCH RBC QN AUTO: 30.2 PG (ref 26–35)
MCHC RBC AUTO-ENTMCNC: 30 G/DL (ref 32–34.5)
MCV RBC AUTO: 100.5 FL (ref 80–99.9)
PDW BLD-RTO: 13.7 % (ref 11.5–15)
PLATELET # BLD: 283 K/UL (ref 130–450)
PMV BLD AUTO: 10 FL (ref 7–12)
POTASSIUM SERPL-SCNC: 4.7 MMOL/L (ref 3.5–5)
RBC # BLD: 4.34 M/UL (ref 3.8–5.8)
SEDIMENTATION RATE, ERYTHROCYTE: 18 MM/HR (ref 0–15)
SODIUM BLD-SCNC: 136 MMOL/L (ref 132–146)
TOTAL PROTEIN: 7.3 G/DL (ref 6.4–8.3)
TRIGL SERPL-MCNC: 54 MG/DL
VLDLC SERPL CALC-MCNC: 11 MG/DL
WBC # BLD: 8 K/UL (ref 4.5–11.5)

## 2023-07-20 LAB — TSH SERPL DL<=0.05 MIU/L-ACNC: 5.71 UIU/ML (ref 0.27–4.2)

## 2023-07-28 RX ORDER — LEVOTHYROXINE SODIUM 0.05 MG/1
50 TABLET ORAL DAILY
Qty: 90 TABLET | Refills: 1 | Status: SHIPPED | OUTPATIENT
Start: 2023-07-28

## 2023-09-11 RX ORDER — TAMSULOSIN HYDROCHLORIDE 0.4 MG/1
CAPSULE ORAL
Qty: 90 CAPSULE | Refills: 3 | Status: SHIPPED
Start: 2023-09-11 | End: 2023-09-12 | Stop reason: SDUPTHER

## 2023-09-12 ENCOUNTER — TELEPHONE (OUTPATIENT)
Dept: PRIMARY CARE CLINIC | Age: 88
End: 2023-09-12

## 2023-09-12 RX ORDER — TAMSULOSIN HYDROCHLORIDE 0.4 MG/1
CAPSULE ORAL
Qty: 90 CAPSULE | Refills: 3 | Status: SHIPPED | OUTPATIENT
Start: 2023-09-12

## 2023-09-12 NOTE — TELEPHONE ENCOUNTER
Patient calling in to follow up on refill      States Tamsulosin was to go to [facility] not [facility], asking if that can be corrected?   (Was sent in yesterday)      Tamsulosin 0.4 mg #90  Si qd    Thomas ([address] [address] [address])

## 2023-09-26 ENCOUNTER — OFFICE VISIT (OUTPATIENT)
Dept: PRIMARY CARE CLINIC | Age: 88
End: 2023-09-26
Payer: MEDICARE

## 2023-09-26 VITALS
SYSTOLIC BLOOD PRESSURE: 130 MMHG | HEIGHT: 67 IN | WEIGHT: 190 LBS | OXYGEN SATURATION: 96 % | TEMPERATURE: 97.6 F | BODY MASS INDEX: 29.82 KG/M2 | HEART RATE: 71 BPM | DIASTOLIC BLOOD PRESSURE: 72 MMHG

## 2023-09-26 DIAGNOSIS — M15.9 GENERALIZED OSTEOARTHRITIS: ICD-10-CM

## 2023-09-26 DIAGNOSIS — E03.9 ACQUIRED HYPOTHYROIDISM: ICD-10-CM

## 2023-09-26 DIAGNOSIS — Z98.890 HISTORY OF CARPAL TUNNEL REPAIR: ICD-10-CM

## 2023-09-26 DIAGNOSIS — E03.9 ACQUIRED HYPOTHYROIDISM: Primary | ICD-10-CM

## 2023-09-26 PROCEDURE — G8417 CALC BMI ABV UP PARAM F/U: HCPCS | Performed by: FAMILY MEDICINE

## 2023-09-26 PROCEDURE — 1036F TOBACCO NON-USER: CPT | Performed by: FAMILY MEDICINE

## 2023-09-26 PROCEDURE — 99214 OFFICE O/P EST MOD 30 MIN: CPT | Performed by: FAMILY MEDICINE

## 2023-09-26 PROCEDURE — G8427 DOCREV CUR MEDS BY ELIG CLIN: HCPCS | Performed by: FAMILY MEDICINE

## 2023-09-26 PROCEDURE — 1123F ACP DISCUSS/DSCN MKR DOCD: CPT | Performed by: FAMILY MEDICINE

## 2023-09-26 RX ORDER — TRIAMCINOLONE ACETONIDE 1 MG/G
CREAM TOPICAL
COMMUNITY
Start: 2023-08-24

## 2023-09-26 ASSESSMENT — ENCOUNTER SYMPTOMS
SORE THROAT: 0
NAUSEA: 0
DIARRHEA: 0
EYE ITCHING: 0
PHOTOPHOBIA: 0
ABDOMINAL PAIN: 0
SHORTNESS OF BREATH: 0
WHEEZING: 0
BLOOD IN STOOL: 0
ABDOMINAL DISTENTION: 0
EYE DISCHARGE: 0
COLOR CHANGE: 0
RHINORRHEA: 0
VOMITING: 0
SINUS PRESSURE: 0
CONSTIPATION: 0
EYE PAIN: 0
FACIAL SWELLING: 0

## 2023-09-26 NOTE — PROGRESS NOTES
11.5 k/uL Final     RBC   Date Value Ref Range Status   07/18/2023 4.34 3.80 - 5.80 m/uL Final     Hemoglobin   Date Value Ref Range Status   07/18/2023 13.1 12.5 - 16.5 g/dL Final     Hematocrit   Date Value Ref Range Status   07/18/2023 43.6 37.0 - 54.0 % Final     MCV   Date Value Ref Range Status   07/18/2023 100.5 (H) 80.0 - 99.9 fL Final     MCH   Date Value Ref Range Status   07/18/2023 30.2 26.0 - 35.0 pg Final     MCHC   Date Value Ref Range Status   07/18/2023 30.0 (L) 32.0 - 34.5 g/dL Final     RDW   Date Value Ref Range Status   07/18/2023 13.7 11.5 - 15.0 % Final     Platelets   Date Value Ref Range Status   07/18/2023 283 130 - 450 k/uL Final     MPV   Date Value Ref Range Status   07/18/2023 10.0 7.0 - 12.0 fL Final     Neutrophils %   Date Value Ref Range Status   12/15/2021 61.7 43.0 - 80.0 % Final     Immature Granulocytes #   Date Value Ref Range Status   12/15/2021 0.04 E9/L Final     Immature Granulocytes %   Date Value Ref Range Status   12/15/2021 0.7 0.0 - 5.0 % Final     Lymphocytes %   Date Value Ref Range Status   12/15/2021 24.0 20.0 - 42.0 % Final     Monocytes %   Date Value Ref Range Status   12/15/2021 9.5 2.0 - 12.0 % Final     Eosinophils %   Date Value Ref Range Status   12/15/2021 2.7 0.0 - 6.0 % Final     Basophils %   Date Value Ref Range Status   12/15/2021 1.4 0.0 - 2.0 % Final     Neutrophils Absolute   Date Value Ref Range Status   12/15/2021 3.63 1.80 - 7.30 E9/L Final     Lymphocytes Absolute   Date Value Ref Range Status   12/15/2021 1.41 (L) 1.50 - 4.00 E9/L Final     Monocytes Absolute   Date Value Ref Range Status   12/15/2021 0.56 0.10 - 0.95 E9/L Final     Eosinophils Absolute   Date Value Ref Range Status   12/15/2021 0.16 0.05 - 0.50 E9/L Final     Basophils Absolute   Date Value Ref Range Status   12/15/2021 0.08 0.00 - 0.20 E9/L Final       CMP  Sodium   Date Value Ref Range Status   07/18/2023 136 132 - 146 mmol/L Final     Potassium   Date Value Ref Range

## 2023-09-27 LAB — TSH SERPL DL<=0.05 MIU/L-ACNC: 3.54 UIU/ML (ref 0.27–4.2)

## 2023-11-15 ENCOUNTER — OFFICE VISIT (OUTPATIENT)
Dept: PRIMARY CARE CLINIC | Age: 88
End: 2023-11-15
Payer: MEDICARE

## 2023-11-15 VITALS
SYSTOLIC BLOOD PRESSURE: 114 MMHG | WEIGHT: 189 LBS | HEART RATE: 71 BPM | BODY MASS INDEX: 29.66 KG/M2 | HEIGHT: 67 IN | OXYGEN SATURATION: 96 % | TEMPERATURE: 98.4 F | DIASTOLIC BLOOD PRESSURE: 70 MMHG

## 2023-11-15 DIAGNOSIS — K11.20 SIALADENITIS: Primary | ICD-10-CM

## 2023-11-15 PROCEDURE — 1036F TOBACCO NON-USER: CPT | Performed by: FAMILY MEDICINE

## 2023-11-15 PROCEDURE — G8484 FLU IMMUNIZE NO ADMIN: HCPCS | Performed by: FAMILY MEDICINE

## 2023-11-15 PROCEDURE — G8417 CALC BMI ABV UP PARAM F/U: HCPCS | Performed by: FAMILY MEDICINE

## 2023-11-15 PROCEDURE — 99213 OFFICE O/P EST LOW 20 MIN: CPT | Performed by: FAMILY MEDICINE

## 2023-11-15 PROCEDURE — 1123F ACP DISCUSS/DSCN MKR DOCD: CPT | Performed by: FAMILY MEDICINE

## 2023-11-15 PROCEDURE — G8427 DOCREV CUR MEDS BY ELIG CLIN: HCPCS | Performed by: FAMILY MEDICINE

## 2023-11-15 RX ORDER — CEFDINIR 300 MG/1
300 CAPSULE ORAL 2 TIMES DAILY
Qty: 20 CAPSULE | Refills: 0 | Status: SHIPPED | OUTPATIENT
Start: 2023-11-15 | End: 2023-11-25

## 2023-11-15 ASSESSMENT — ENCOUNTER SYMPTOMS
SORE THROAT: 0
FACIAL SWELLING: 1
BACK PAIN: 1
COUGH: 1
WHEEZING: 0
CHEST TIGHTNESS: 0
GASTROINTESTINAL NEGATIVE: 1
EYES NEGATIVE: 1
SINUS PRESSURE: 0
SHORTNESS OF BREATH: 0

## 2023-11-15 NOTE — PROGRESS NOTES
Yoon Rosario (:  1935) is a 81 y.o. male,Established patient, here for evaluation of the following chief complaint(s):  Congestion and Cough (Pt has been congested and cough x 3-4 weeks. Drainage is mostly clear in clear occasionally yellow. R gland is also swollen)         ASSESSMENT/PLAN:  1. Sialadenitis      PLAN:  Rx Omnicef 300 mg twice daily for 10 days. Patient has been given Ancef in the past without adverse reaction. Doubt true penicillin allergy. Advise lemon water. Return if symptoms worsen or fail to improve. Subjective   SUBJECTIVE/OBJECTIVE:  Patient here for Sick Visit. He complains of coughing congestion for the past 3-4 weeks. He denies fever, chills or shortness of breath. Sputum is nonpurulent. 2 days ago he awakened with swelling of his right parotid gland. He does admit to some foul tasting drainage on the right. He has been massaging the gland on a regular basis and its about a third of the size that it was in the beginning. Review of Systems   Constitutional:  Negative for chills, diaphoresis and fever. HENT:  Positive for congestion and facial swelling (Right). Negative for ear pain, sinus pressure and sore throat. Eyes: Negative. Respiratory:  Positive for cough. Negative for chest tightness, shortness of breath and wheezing. Cardiovascular: Negative. Gastrointestinal: Negative. Endocrine: Negative. Genitourinary:  Positive for difficulty urinating, frequency and urgency. Musculoskeletal:  Positive for back pain. Negative for myalgias. Neurological: Negative. Psychiatric/Behavioral: Negative.           Immunization History   Administered Date(s) Administered    COVID-19, MODERNA BLUE border, Primary or Immunocompromised, (age 12y+), IM, 100 mcg/0.5mL 2021, 2021, 2021    COVID-19, MODERNA Bivalent, (age 12y+), IM, 50 mcg/0.5 mL 2022    Influenza, FLUAD, (age 72 y+), Adjuvanted, 0.5mL 2022

## 2023-11-22 ENCOUNTER — TELEPHONE (OUTPATIENT)
Dept: PRIMARY CARE CLINIC | Age: 88
End: 2023-11-22

## 2023-11-22 NOTE — TELEPHONE ENCOUNTER
PC from pt, has been taking Cefdinir for 6 days (has 4 days left of Rx) and is feeling bloated. Asked if that was normal.    Per Sis Odom PA-C, bloating is normal side effect. Can use Gas-X OTC to help with bloating. Pt denies and diarrhea/Nausea/Vomitting. Pt voiced understanding.

## 2024-01-04 ENCOUNTER — OFFICE VISIT (OUTPATIENT)
Dept: PRIMARY CARE CLINIC | Age: 89
End: 2024-01-04
Payer: MEDICARE

## 2024-01-04 VITALS
OXYGEN SATURATION: 96 % | HEART RATE: 82 BPM | WEIGHT: 187 LBS | DIASTOLIC BLOOD PRESSURE: 80 MMHG | SYSTOLIC BLOOD PRESSURE: 136 MMHG | TEMPERATURE: 97.8 F | BODY MASS INDEX: 29.35 KG/M2 | HEIGHT: 67 IN

## 2024-01-04 DIAGNOSIS — Z00.00 ENCOUNTER FOR MEDICARE ANNUAL WELLNESS EXAM: Primary | ICD-10-CM

## 2024-01-04 DIAGNOSIS — E03.9 ACQUIRED HYPOTHYROIDISM: ICD-10-CM

## 2024-01-04 DIAGNOSIS — J34.89 NASAL LESION: ICD-10-CM

## 2024-01-04 DIAGNOSIS — Z00.00 MEDICARE ANNUAL WELLNESS VISIT, SUBSEQUENT: ICD-10-CM

## 2024-01-04 LAB — TSH SERPL DL<=0.05 MIU/L-ACNC: 4.13 UIU/ML (ref 0.27–4.2)

## 2024-01-04 PROCEDURE — G0439 PPPS, SUBSEQ VISIT: HCPCS | Performed by: FAMILY MEDICINE

## 2024-01-04 PROCEDURE — 1123F ACP DISCUSS/DSCN MKR DOCD: CPT | Performed by: FAMILY MEDICINE

## 2024-01-04 PROCEDURE — G8484 FLU IMMUNIZE NO ADMIN: HCPCS | Performed by: FAMILY MEDICINE

## 2024-01-04 PROCEDURE — 36415 COLL VENOUS BLD VENIPUNCTURE: CPT | Performed by: FAMILY MEDICINE

## 2024-01-04 ASSESSMENT — PATIENT HEALTH QUESTIONNAIRE - PHQ9
SUM OF ALL RESPONSES TO PHQ QUESTIONS 1-9: 0
SUM OF ALL RESPONSES TO PHQ9 QUESTIONS 1 & 2: 0
2. FEELING DOWN, DEPRESSED OR HOPELESS: 0
SUM OF ALL RESPONSES TO PHQ QUESTIONS 1-9: 0
1. LITTLE INTEREST OR PLEASURE IN DOING THINGS: 0

## 2024-01-04 NOTE — PROGRESS NOTES
Venipuncture was obtained from right arm. Patient tolerated the procedure without complications or complaints.  
S2, no murmurs, rubs, clicks or gallops, distal pulses intact, no carotid bruits  Abdomen: soft, non-tender, non-distended, normal bowel sounds, no masses or organomegaly  Extremities: no cyanosis, clubbing or edema and mild swelling left ankle and foot.  Pedal pulses are diminished.  No calf tenderness.  Homans' sign is negative.  Neurologic: gait and coordination normal and speech normal    Plan:  Referral to Dr. Regan for nasal lesion..  Vamshi TSH.  Patient declined left ankle x-ray.    CareTeam (Including outside providers/suppliers regularly involved in providing care):   Patient Care Team:  Alfred Hernandez DO as PCP - General (Family Medicine)  Alfred Hernandez DO as PCP - Empaneled Provider     Reviewed and updated this visit:  Tobacco  Allergies  Meds  Problems  Med Hx  Surg Hx  Soc Hx  Fam Hx

## 2024-01-04 NOTE — PATIENT INSTRUCTIONS
Summary for your review.    Other Preventive Recommendations:    A preventive eye exam performed by an eye specialist is recommended every 1-2 years to screen for glaucoma; cataracts, macular degeneration, and other eye disorders.  A preventive dental visit is recommended every 6 months.  Try to get at least 150 minutes of exercise per week or 10,000 steps per day on a pedometer .  Order or download the FREE \"Exercise & Physical Activity: Your Everyday Guide\" from [employer]. Call 1-975.254.3922 or search [employer] online.  You need 5811-1622 mg of calcium and 8014-5763 IU of vitamin D per day. It is possible to meet your calcium requirement with diet alone, but a vitamin D supplement is usually necessary to meet this goal.  When exposed to the sun, use a sunscreen that protects against both UVA and UVB radiation with an SPF of 30 or greater. Reapply every 2 to 3 hours or after sweating, drying off with a towel, or swimming.  Always wear a seat belt when traveling in a car. Always wear a helmet when riding a bicycle or motorcycle.

## 2024-01-05 ENCOUNTER — TELEPHONE (OUTPATIENT)
Dept: PRIMARY CARE CLINIC | Age: 89
End: 2024-01-05

## 2024-01-05 NOTE — TELEPHONE ENCOUNTER
PC from pt, stated he was in yesterday (1/4/24) and mentioned a pain in the bottom of his left leg/foot area.      Stated he would like to have an xray to see what may be causing the pain.  Stated he is having pain in his groin area now.    Please advise.    Pt aware provider is out of office today.

## 2024-01-07 DIAGNOSIS — M65.9 SYNOVITIS OF LEFT ANKLE: Primary | ICD-10-CM

## 2024-01-08 ENCOUNTER — TELEPHONE (OUTPATIENT)
Dept: PRIMARY CARE CLINIC | Age: 89
End: 2024-01-08

## 2024-01-08 RX ORDER — LEVOTHYROXINE SODIUM 0.05 MG/1
50 TABLET ORAL DAILY
Qty: 90 TABLET | Refills: 1 | Status: SHIPPED | OUTPATIENT
Start: 2024-01-08

## 2024-01-08 NOTE — TELEPHONE ENCOUNTER
PC to pt notifying Orders for xrays have been placed.      Pt voiced understanding and stated he will be going to  [facility].    Orders for xray faxed to [facility].

## 2024-01-16 ENCOUNTER — TELEPHONE (OUTPATIENT)
Dept: PRIMARY CARE CLINIC | Age: 89
End: 2024-01-16

## 2024-01-16 NOTE — TELEPHONE ENCOUNTER
----- Message from Alfred Hernandez DO sent at 1/14/2024 11:48 AM EST -----  X-ray left foot and ankle reviewed.  There are mild to moderate degenerative changes (osteoarthritis) of the left ankle.  There is a moderate-sized heel spur.  Left foot x-ray revealed evidence of osteoarthritis.

## 2024-01-18 DIAGNOSIS — M65.9 SYNOVITIS OF LEFT ANKLE: ICD-10-CM

## 2024-09-01 DIAGNOSIS — N40.1 BENIGN PROSTATIC HYPERPLASIA WITH LOWER URINARY TRACT SYMPTOMS, SYMPTOM DETAILS UNSPECIFIED: ICD-10-CM

## 2024-09-01 RX ORDER — TAMSULOSIN HYDROCHLORIDE 0.4 MG/1
CAPSULE ORAL
Qty: 90 CAPSULE | Refills: 1 | OUTPATIENT
Start: 2024-09-01

## 2025-01-28 ENCOUNTER — OFFICE VISIT (OUTPATIENT)
Dept: PODIATRY | Age: 89
End: 2025-01-28
Payer: MEDICARE

## 2025-01-28 VITALS — BODY MASS INDEX: 28.93 KG/M2 | WEIGHT: 180 LBS | HEIGHT: 66 IN

## 2025-01-28 DIAGNOSIS — R60.0 LOCALIZED EDEMA: ICD-10-CM

## 2025-01-28 DIAGNOSIS — R26.2 DIFFICULTY WALKING: ICD-10-CM

## 2025-01-28 DIAGNOSIS — S90.32XA CONTUSION OF LEFT FOOT, INITIAL ENCOUNTER: Primary | ICD-10-CM

## 2025-01-28 DIAGNOSIS — M25.572 PAIN IN LEFT ANKLE AND JOINTS OF LEFT FOOT: ICD-10-CM

## 2025-01-28 DIAGNOSIS — L60.0 OC (ONYCHOCRYPTOSIS): ICD-10-CM

## 2025-01-28 PROCEDURE — 1123F ACP DISCUSS/DSCN MKR DOCD: CPT | Performed by: PODIATRIST

## 2025-01-28 PROCEDURE — 29580 STRAPPING UNNA BOOT: CPT | Performed by: PODIATRIST

## 2025-01-28 PROCEDURE — G8417 CALC BMI ABV UP PARAM F/U: HCPCS | Performed by: PODIATRIST

## 2025-01-28 PROCEDURE — 1159F MED LIST DOCD IN RCRD: CPT | Performed by: PODIATRIST

## 2025-01-28 PROCEDURE — 1036F TOBACCO NON-USER: CPT | Performed by: PODIATRIST

## 2025-01-28 PROCEDURE — G8427 DOCREV CUR MEDS BY ELIG CLIN: HCPCS | Performed by: PODIATRIST

## 2025-01-28 PROCEDURE — 99203 OFFICE O/P NEW LOW 30 MIN: CPT | Performed by: PODIATRIST
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
## 2025-02-04 ENCOUNTER — OFFICE VISIT (OUTPATIENT)
Dept: PODIATRY | Age: 89
End: 2025-02-04
Payer: MEDICARE

## 2025-02-04 VITALS — WEIGHT: 180 LBS | BODY MASS INDEX: 28.93 KG/M2 | HEIGHT: 66 IN

## 2025-02-04 DIAGNOSIS — S90.32XA CONTUSION OF LEFT FOOT, INITIAL ENCOUNTER: Primary | ICD-10-CM

## 2025-02-04 DIAGNOSIS — R26.2 DIFFICULTY WALKING: ICD-10-CM

## 2025-02-04 PROCEDURE — 1123F ACP DISCUSS/DSCN MKR DOCD: CPT | Performed by: PODIATRIST

## 2025-02-04 PROCEDURE — 99213 OFFICE O/P EST LOW 20 MIN: CPT | Performed by: PODIATRIST

## 2025-02-04 PROCEDURE — 1159F MED LIST DOCD IN RCRD: CPT | Performed by: PODIATRIST

## 2025-02-04 PROCEDURE — 1036F TOBACCO NON-USER: CPT | Performed by: PODIATRIST

## 2025-02-04 PROCEDURE — G8427 DOCREV CUR MEDS BY ELIG CLIN: HCPCS | Performed by: PODIATRIST

## 2025-02-04 PROCEDURE — G8417 CALC BMI ABV UP PARAM F/U: HCPCS | Performed by: PODIATRIST

## 2025-02-04 NOTE — PROGRESS NOTES
25     Javier Lawrence    : 1935   Sex: male    Age: 89 y.o.    Patient's PCP/Provider is:  Gabriel Traylor DO    Subjective:  Patient is seen today for follow-up regarding continued care regarding contusion associated edematous issues left lower extremity.  Overall patient is doing well at this time without any additional abnormalities noted.  Patient is back in his regular shoe gear and wearing appropriate socks with daily activities.  Patient is pleased with current care provided.    Chief Complaint   Patient presents with    Foot Injury     Contusion of left foot ,unna boot       ROS:  Const: Positives and pertinent negatives as per HPI.    Musculo: Denies symptoms other than stated above.  Neuro: Denies symptoms other than stated above.  Skin: Denies symptoms other than stated above.    Current Medications:    Current Outpatient Medications:     levothyroxine (SYNTHROID) 75 MCG tablet, Take 1 tablet by mouth daily, Disp: 90 tablet, Rfl: 1    tamsulosin (FLOMAX) 0.4 MG capsule, TAKE 1 CAPSULE BY MOUTH EVERY DAY, Disp: 90 capsule, Rfl: 1    docusate sodium (COLACE) 100 MG capsule, Take 1 capsule by mouth 2 times daily, Disp: , Rfl:     Lutein 20 MG CAPS, Take 20 mg by mouth daily, Disp: , Rfl:     acetaminophen (TYLENOL) 500 MG tablet, Take 1 tablet by mouth every 6 hours as needed for Pain, Disp: , Rfl:     triamcinolone (KENALOG) 0.1 % cream, , Disp: , Rfl:     saline nasal gel (AYR) GEL, by Nasal route in the morning and at bedtime, Disp: 1 each, Rfl: 3    Multiple Vitamins-Minerals (THERAPEUTIC MULTIVITAMIN-MINERALS) tablet, Take 1 tablet by mouth daily, Disp: , Rfl:     Allergies:  Allergies   Allergen Reactions    Amoxil [Amoxicillin]      Urine Retention  Pt unsure of allergy       Vitals:    25 1102   Weight: 81.6 kg (180 lb)   Height: 1.676 m (5' 6\")       Exam:  Neurovascular status unchanged.  Increased range of motion noted right ankle, subtalar joint, midtarsal joint regions left.

## 2025-02-04 NOTE — PROGRESS NOTES
Patient here for contusion of left foot. Patient took unna boot off after 2.5 days as he had swelling. Gabriel Traylor DO last visit 10/24/2024   Electronically signed by Elena Abad LPN on 2/4/2025 at 11:00 AM

## 2025-04-29 ENCOUNTER — APPOINTMENT (OUTPATIENT)
Dept: GENERAL RADIOLOGY | Age: 89
DRG: 282 | End: 2025-04-29
Payer: MEDICARE

## 2025-04-29 ENCOUNTER — APPOINTMENT (OUTPATIENT)
Age: 89
DRG: 282 | End: 2025-04-29
Payer: MEDICARE

## 2025-04-29 ENCOUNTER — HOSPITAL ENCOUNTER (INPATIENT)
Age: 89
LOS: 2 days | Discharge: HOME | DRG: 282 | End: 2025-05-01
Attending: EMERGENCY MEDICINE | Admitting: INTERNAL MEDICINE
Payer: MEDICARE

## 2025-04-29 ENCOUNTER — APPOINTMENT (OUTPATIENT)
Age: 89
DRG: 282 | End: 2025-04-29
Attending: INTERNAL MEDICINE
Payer: MEDICARE

## 2025-04-29 ENCOUNTER — APPOINTMENT (OUTPATIENT)
Dept: NUCLEAR MEDICINE | Age: 89
DRG: 282 | End: 2025-04-29
Payer: MEDICARE

## 2025-04-29 DIAGNOSIS — I21.4 NSTEMI (NON-ST ELEVATED MYOCARDIAL INFARCTION) (HCC): ICD-10-CM

## 2025-04-29 DIAGNOSIS — R07.9 CHEST PAIN, UNSPECIFIED TYPE: Primary | ICD-10-CM

## 2025-04-29 DIAGNOSIS — I50.21 ACUTE SYSTOLIC CONGESTIVE HEART FAILURE (HCC): ICD-10-CM

## 2025-04-29 LAB
ANION GAP SERPL CALCULATED.3IONS-SCNC: 8 MMOL/L (ref 7–16)
B PARAP IS1001 DNA NPH QL NAA+NON-PROBE: NOT DETECTED
B PERT DNA SPEC QL NAA+PROBE: NOT DETECTED
BASOPHILS # BLD: 0.06 K/UL (ref 0–0.2)
BASOPHILS NFR BLD: 1 % (ref 0–2)
BUN SERPL-MCNC: 17 MG/DL (ref 6–23)
C PNEUM DNA NPH QL NAA+NON-PROBE: NOT DETECTED
CALCIUM SERPL-MCNC: 9.2 MG/DL (ref 8.6–10.2)
CHLORIDE SERPL-SCNC: 105 MMOL/L (ref 98–107)
CO2 SERPL-SCNC: 27 MMOL/L (ref 22–29)
CREAT SERPL-MCNC: 1 MG/DL (ref 0.7–1.2)
ECHO AO ASC DIAM: 3.3 CM
ECHO AO ASCENDING AORTA INDEX: 1.68 CM/M2
ECHO AR MAX VEL PISA: 3.6 M/S
ECHO AV AREA PEAK VELOCITY: 2.5 CM2
ECHO AV AREA PEAK VELOCITY: 2.7 CM2
ECHO AV AREA PEAK VELOCITY: 2.8 CM2
ECHO AV AREA PEAK VELOCITY: 3 CM2
ECHO AV AREA VTI: 3 CM2
ECHO AV AREA/BSA VTI: 1.5 CM2/M2
ECHO AV CUSP MM: 2.1 CM
ECHO AV MEAN GRADIENT: 7 MMHG
ECHO AV MEAN VELOCITY: 1.3 M/S
ECHO AV PEAK GRADIENT: 10 MMHG
ECHO AV PEAK GRADIENT: 11 MMHG
ECHO AV PEAK VELOCITY: 1.5 M/S
ECHO AV PEAK VELOCITY: 1.6 M/S
ECHO AV REGURGITANT PHT: 1407.2 MS
ECHO AV VTI: 35.1 CM
ECHO BSA: 1.99 M2
ECHO LA DIAMETER INDEX: 1.94 CM/M2
ECHO LA DIAMETER: 3.8 CM
ECHO LA VOL A-L A2C: 90 ML (ref 18–58)
ECHO LA VOL A-L A4C: 50 ML (ref 18–58)
ECHO LA VOL BP: 62 ML (ref 18–58)
ECHO LA VOL MOD A2C: 88 ML (ref 18–58)
ECHO LA VOL MOD A4C: 43 ML (ref 18–58)
ECHO LA VOL/BSA BIPLANE: 32 ML/M2 (ref 16–34)
ECHO LA VOLUME AREA LENGTH: 68 ML
ECHO LA VOLUME INDEX A-L A2C: 46 ML/M2 (ref 16–34)
ECHO LA VOLUME INDEX A-L A4C: 26 ML/M2 (ref 16–34)
ECHO LA VOLUME INDEX AREA LENGTH: 35 ML/M2 (ref 16–34)
ECHO LA VOLUME INDEX MOD A2C: 45 ML/M2 (ref 16–34)
ECHO LA VOLUME INDEX MOD A4C: 22 ML/M2 (ref 16–34)
ECHO LV EF PHYSICIAN: 60 %
ECHO LV FRACTIONAL SHORTENING: 33 % (ref 28–44)
ECHO LV INTERNAL DIMENSION DIASTOLE INDEX: 2.14 CM/M2
ECHO LV INTERNAL DIMENSION DIASTOLIC: 4.2 CM (ref 4.2–5.9)
ECHO LV INTERNAL DIMENSION SYSTOLIC INDEX: 1.43 CM/M2
ECHO LV INTERNAL DIMENSION SYSTOLIC: 2.8 CM
ECHO LV IVSD: 1.2 CM (ref 0.6–1)
ECHO LV IVSS: 1.8 CM
ECHO LV MASS 2D: 178.2 G (ref 88–224)
ECHO LV MASS INDEX 2D: 90.9 G/M2 (ref 49–115)
ECHO LV POSTERIOR WALL DIASTOLIC: 1.2 CM (ref 0.6–1)
ECHO LV POSTERIOR WALL SYSTOLIC: 1.4 CM
ECHO LV RELATIVE WALL THICKNESS RATIO: 0.57
ECHO LVOT AREA: 4.2 CM2
ECHO LVOT AV VTI INDEX: 0.7
ECHO LVOT DIAM: 2.3 CM
ECHO LVOT MEAN GRADIENT: 3 MMHG
ECHO LVOT PEAK GRADIENT: 4 MMHG
ECHO LVOT PEAK GRADIENT: 4 MMHG
ECHO LVOT PEAK VELOCITY: 1 M/S
ECHO LVOT PEAK VELOCITY: 1.1 M/S
ECHO LVOT STROKE VOLUME INDEX: 51.7 ML/M2
ECHO LVOT SV: 101.3 ML
ECHO LVOT VTI: 24.4 CM
ECHO MV "A" WAVE DURATION: 194.1 MSEC
ECHO MV A VELOCITY: 0.77 M/S
ECHO MV AREA PHT: 5 CM2
ECHO MV AREA VTI: 3.8 CM2
ECHO MV E DECELERATION TIME (DT): 265.7 MS
ECHO MV E VELOCITY: 0.85 M/S
ECHO MV E/A RATIO: 1.1
ECHO MV LVOT VTI INDEX: 1.09
ECHO MV MAX VELOCITY: 1.1 M/S
ECHO MV MEAN GRADIENT: 2 MMHG
ECHO MV MEAN VELOCITY: 0.7 M/S
ECHO MV PEAK GRADIENT: 4 MMHG
ECHO MV PRESSURE HALF TIME (PHT): 43.7 MS
ECHO MV VTI: 26.6 CM
ECHO PV MAX VELOCITY: 1 M/S
ECHO PV MEAN GRADIENT: 2 MMHG
ECHO PV MEAN VELOCITY: 0.6 M/S
ECHO PV PEAK GRADIENT: 4 MMHG
ECHO PV VTI: 21.5 CM
ECHO PVEIN A DURATION: 156 MS
ECHO PVEIN A VELOCITY: 0.4 M/S
ECHO PVEIN PEAK D VELOCITY: 0.4 M/S
ECHO PVEIN PEAK S VELOCITY: 0.5 M/S
ECHO PVEIN S/D RATIO: 1.3
ECHO RV INTERNAL DIMENSION: 4.2 CM
ECHO RV LONGITUDINAL DIMENSION: 5.3 CM
ECHO RV MID DIMENSION: 4.2 CM
ECHO RVOT MEAN GRADIENT: 1 MMHG
ECHO RVOT PEAK GRADIENT: 1 MMHG
ECHO RVOT PEAK VELOCITY: 0.6 M/S
ECHO RVOT VTI: 12.2 CM
ECHO TV ALIASING VELOCITY (NYQUIST): 24 CM/S
ECHO TV EROA: 0.2 CM2
ECHO TV REGURGITANT MAX VELOCITY: 3.19 M/S
ECHO TV REGURGITANT PEAK GRADIENT: 41 MMHG
ECHO TV REGURGITANT VELOCITY PISA: 3.4 M/S
ECHO TV REGURGITANT VTI: 104.6 CM
EOSINOPHIL # BLD: 0.19 K/UL (ref 0.05–0.5)
EOSINOPHILS RELATIVE PERCENT: 4 % (ref 0–6)
ERYTHROCYTE [DISTWIDTH] IN BLOOD BY AUTOMATED COUNT: 13 % (ref 11.5–15)
FLUAV RNA NPH QL NAA+NON-PROBE: NOT DETECTED
FLUBV RNA NPH QL NAA+NON-PROBE: NOT DETECTED
GFR, ESTIMATED: 70 ML/MIN/1.73M2
GLUCOSE SERPL-MCNC: 105 MG/DL (ref 74–99)
HADV DNA NPH QL NAA+NON-PROBE: NOT DETECTED
HCOV 229E RNA NPH QL NAA+NON-PROBE: NOT DETECTED
HCOV HKU1 RNA NPH QL NAA+NON-PROBE: NOT DETECTED
HCOV NL63 RNA NPH QL NAA+NON-PROBE: NOT DETECTED
HCOV OC43 RNA NPH QL NAA+NON-PROBE: NOT DETECTED
HCT VFR BLD AUTO: 37.9 % (ref 37–54)
HGB BLD-MCNC: 12.5 G/DL (ref 12.5–16.5)
HMPV RNA NPH QL NAA+NON-PROBE: NOT DETECTED
HPIV1 RNA NPH QL NAA+NON-PROBE: NOT DETECTED
HPIV2 RNA NPH QL NAA+NON-PROBE: NOT DETECTED
HPIV3 RNA NPH QL NAA+NON-PROBE: NOT DETECTED
HPIV4 RNA NPH QL NAA+NON-PROBE: NOT DETECTED
IMM GRANULOCYTES # BLD AUTO: <0.03 K/UL (ref 0–0.58)
IMM GRANULOCYTES NFR BLD: 0 % (ref 0–5)
LYMPHOCYTES NFR BLD: 0.78 K/UL (ref 1.5–4)
LYMPHOCYTES RELATIVE PERCENT: 15 % (ref 20–42)
M PNEUMO DNA NPH QL NAA+NON-PROBE: NOT DETECTED
MCH RBC QN AUTO: 32.6 PG (ref 26–35)
MCHC RBC AUTO-ENTMCNC: 33 G/DL (ref 32–34.5)
MCV RBC AUTO: 98.7 FL (ref 80–99.9)
MONOCYTES NFR BLD: 0.57 K/UL (ref 0.1–0.95)
MONOCYTES NFR BLD: 11 % (ref 2–12)
NEUTROPHILS NFR BLD: 69 % (ref 43–80)
NEUTS SEG NFR BLD: 3.51 K/UL (ref 1.8–7.3)
PLATELET # BLD AUTO: 189 K/UL (ref 130–450)
PMV BLD AUTO: 9.7 FL (ref 7–12)
POTASSIUM SERPL-SCNC: 4 MMOL/L (ref 3.5–5)
RBC # BLD AUTO: 3.84 M/UL (ref 3.8–5.8)
RSV RNA NPH QL NAA+NON-PROBE: NOT DETECTED
RV+EV RNA NPH QL NAA+NON-PROBE: NOT DETECTED
SARS-COV-2 RNA NPH QL NAA+NON-PROBE: NOT DETECTED
SODIUM SERPL-SCNC: 140 MMOL/L (ref 132–146)
SPECIMEN DESCRIPTION: NORMAL
TROPONIN I SERPL HS-MCNC: 23 NG/L (ref 0–22)
TROPONIN I SERPL HS-MCNC: 37 NG/L (ref 0–22)
TROPONIN I SERPL HS-MCNC: 37 NG/L (ref 0–22)
TROPONIN I SERPL HS-MCNC: 43 NG/L (ref 0–22)
WBC OTHER # BLD: 5.1 K/UL (ref 4.5–11.5)

## 2025-04-29 PROCEDURE — 0202U NFCT DS 22 TRGT SARS-COV-2: CPT

## 2025-04-29 PROCEDURE — 1200000000 HC SEMI PRIVATE

## 2025-04-29 PROCEDURE — 85025 COMPLETE CBC W/AUTO DIFF WBC: CPT

## 2025-04-29 PROCEDURE — 93005 ELECTROCARDIOGRAM TRACING: CPT

## 2025-04-29 PROCEDURE — 78452 HT MUSCLE IMAGE SPECT MULT: CPT

## 2025-04-29 PROCEDURE — 6360000002 HC RX W HCPCS: Performed by: NURSE PRACTITIONER

## 2025-04-29 PROCEDURE — 99285 EMERGENCY DEPT VISIT HI MDM: CPT

## 2025-04-29 PROCEDURE — 6370000000 HC RX 637 (ALT 250 FOR IP): Performed by: NURSE PRACTITIONER

## 2025-04-29 PROCEDURE — 93017 CV STRESS TEST TRACING ONLY: CPT

## 2025-04-29 PROCEDURE — 2500000003 HC RX 250 WO HCPCS: Performed by: NURSE PRACTITIONER

## 2025-04-29 PROCEDURE — 84484 ASSAY OF TROPONIN QUANT: CPT

## 2025-04-29 PROCEDURE — 36415 COLL VENOUS BLD VENIPUNCTURE: CPT

## 2025-04-29 PROCEDURE — 3430000000 HC RX DIAGNOSTIC RADIOPHARMACEUTICAL: Performed by: RADIOLOGY

## 2025-04-29 PROCEDURE — 87899 AGENT NOS ASSAY W/OPTIC: CPT

## 2025-04-29 PROCEDURE — 80048 BASIC METABOLIC PNL TOTAL CA: CPT

## 2025-04-29 PROCEDURE — 6370000000 HC RX 637 (ALT 250 FOR IP): Performed by: EMERGENCY MEDICINE

## 2025-04-29 PROCEDURE — A9500 TC99M SESTAMIBI: HCPCS | Performed by: RADIOLOGY

## 2025-04-29 PROCEDURE — 93306 TTE W/DOPPLER COMPLETE: CPT | Performed by: INTERNAL MEDICINE

## 2025-04-29 PROCEDURE — 93306 TTE W/DOPPLER COMPLETE: CPT

## 2025-04-29 PROCEDURE — 87449 NOS EACH ORGANISM AG IA: CPT

## 2025-04-29 PROCEDURE — 71045 X-RAY EXAM CHEST 1 VIEW: CPT

## 2025-04-29 RX ORDER — M-VIT,TX,IRON,MINS/CALC/FOLIC 27MG-0.4MG
1 TABLET ORAL DAILY
Status: DISCONTINUED | OUTPATIENT
Start: 2025-04-29 | End: 2025-05-01 | Stop reason: HOSPADM

## 2025-04-29 RX ORDER — ACETAMINOPHEN 650 MG/1
650 SUPPOSITORY RECTAL EVERY 6 HOURS PRN
Status: DISCONTINUED | OUTPATIENT
Start: 2025-04-29 | End: 2025-05-01 | Stop reason: HOSPADM

## 2025-04-29 RX ORDER — DOCUSATE SODIUM 100 MG/1
100 CAPSULE, LIQUID FILLED ORAL 2 TIMES DAILY PRN
Status: DISCONTINUED | OUTPATIENT
Start: 2025-04-29 | End: 2025-05-01 | Stop reason: HOSPADM

## 2025-04-29 RX ORDER — ALBUTEROL SULFATE 0.83 MG/ML
2.5 SOLUTION RESPIRATORY (INHALATION) EVERY 4 HOURS PRN
Status: DISCONTINUED | OUTPATIENT
Start: 2025-04-29 | End: 2025-05-01 | Stop reason: HOSPADM

## 2025-04-29 RX ORDER — ONDANSETRON 2 MG/ML
4 INJECTION INTRAMUSCULAR; INTRAVENOUS EVERY 6 HOURS PRN
Status: DISCONTINUED | OUTPATIENT
Start: 2025-04-29 | End: 2025-05-01 | Stop reason: HOSPADM

## 2025-04-29 RX ORDER — POTASSIUM CHLORIDE 7.45 MG/ML
10 INJECTION INTRAVENOUS PRN
Status: DISCONTINUED | OUTPATIENT
Start: 2025-04-29 | End: 2025-05-01 | Stop reason: HOSPADM

## 2025-04-29 RX ORDER — SODIUM CHLORIDE 9 MG/ML
INJECTION, SOLUTION INTRAVENOUS PRN
Status: DISCONTINUED | OUTPATIENT
Start: 2025-04-29 | End: 2025-05-01 | Stop reason: HOSPADM

## 2025-04-29 RX ORDER — TETRAKIS(2-METHOXYISOBUTYLISOCYANIDE)COPPER(I) TETRAFLUOROBORATE 1 MG/ML
10 INJECTION, POWDER, LYOPHILIZED, FOR SOLUTION INTRAVENOUS
Status: COMPLETED | OUTPATIENT
Start: 2025-04-29 | End: 2025-04-29

## 2025-04-29 RX ORDER — POTASSIUM CHLORIDE 1500 MG/1
40 TABLET, EXTENDED RELEASE ORAL PRN
Status: DISCONTINUED | OUTPATIENT
Start: 2025-04-29 | End: 2025-05-01 | Stop reason: HOSPADM

## 2025-04-29 RX ORDER — SODIUM CHLORIDE 0.9 % (FLUSH) 0.9 %
5-40 SYRINGE (ML) INJECTION EVERY 12 HOURS SCHEDULED
Status: DISCONTINUED | OUTPATIENT
Start: 2025-04-29 | End: 2025-05-01 | Stop reason: HOSPADM

## 2025-04-29 RX ORDER — SODIUM CHLORIDE 0.9 % (FLUSH) 0.9 %
5-40 SYRINGE (ML) INJECTION PRN
Status: DISCONTINUED | OUTPATIENT
Start: 2025-04-29 | End: 2025-05-01 | Stop reason: HOSPADM

## 2025-04-29 RX ORDER — ENOXAPARIN SODIUM 100 MG/ML
1 INJECTION SUBCUTANEOUS 2 TIMES DAILY
Status: DISCONTINUED | OUTPATIENT
Start: 2025-04-29 | End: 2025-05-01

## 2025-04-29 RX ORDER — SPIRONOLACTONE 25 MG
20 TABLET ORAL DAILY
Status: DISCONTINUED | OUTPATIENT
Start: 2025-04-29 | End: 2025-04-29 | Stop reason: CLARIF

## 2025-04-29 RX ORDER — ENOXAPARIN SODIUM 100 MG/ML
40 INJECTION SUBCUTANEOUS EVERY 24 HOURS
Status: DISCONTINUED | OUTPATIENT
Start: 2025-04-29 | End: 2025-04-29

## 2025-04-29 RX ORDER — ASPIRIN 81 MG/1
81 TABLET ORAL DAILY
Status: DISCONTINUED | OUTPATIENT
Start: 2025-04-29 | End: 2025-05-01 | Stop reason: HOSPADM

## 2025-04-29 RX ORDER — NITROGLYCERIN 0.4 MG/1
0.4 TABLET SUBLINGUAL EVERY 5 MIN PRN
Status: DISCONTINUED | OUTPATIENT
Start: 2025-04-29 | End: 2025-05-01 | Stop reason: HOSPADM

## 2025-04-29 RX ORDER — ACETAMINOPHEN 325 MG/1
650 TABLET ORAL EVERY 6 HOURS PRN
Status: DISCONTINUED | OUTPATIENT
Start: 2025-04-29 | End: 2025-05-01 | Stop reason: HOSPADM

## 2025-04-29 RX ORDER — ONDANSETRON 4 MG/1
4 TABLET, ORALLY DISINTEGRATING ORAL EVERY 8 HOURS PRN
Status: DISCONTINUED | OUTPATIENT
Start: 2025-04-29 | End: 2025-05-01 | Stop reason: HOSPADM

## 2025-04-29 RX ORDER — MAGNESIUM SULFATE IN WATER 40 MG/ML
2000 INJECTION, SOLUTION INTRAVENOUS PRN
Status: DISCONTINUED | OUTPATIENT
Start: 2025-04-29 | End: 2025-05-01 | Stop reason: HOSPADM

## 2025-04-29 RX ORDER — ATORVASTATIN CALCIUM 40 MG/1
40 TABLET, FILM COATED ORAL NIGHTLY
Status: DISCONTINUED | OUTPATIENT
Start: 2025-04-29 | End: 2025-05-01 | Stop reason: HOSPADM

## 2025-04-29 RX ORDER — SODIUM CHLORIDE/ALOE VERA
GEL (GRAM) NASAL 2 TIMES DAILY
Status: DISCONTINUED | OUTPATIENT
Start: 2025-04-29 | End: 2025-05-01 | Stop reason: HOSPADM

## 2025-04-29 RX ORDER — TAMSULOSIN HYDROCHLORIDE 0.4 MG/1
0.4 CAPSULE ORAL DAILY
Status: DISCONTINUED | OUTPATIENT
Start: 2025-04-29 | End: 2025-05-01 | Stop reason: HOSPADM

## 2025-04-29 RX ORDER — LEVOTHYROXINE SODIUM 75 UG/1
75 TABLET ORAL DAILY
Status: DISCONTINUED | OUTPATIENT
Start: 2025-04-29 | End: 2025-05-01 | Stop reason: HOSPADM

## 2025-04-29 RX ORDER — PANTOPRAZOLE SODIUM 20 MG/1
20 TABLET, DELAYED RELEASE ORAL
Status: DISCONTINUED | OUTPATIENT
Start: 2025-04-30 | End: 2025-05-01 | Stop reason: HOSPADM

## 2025-04-29 RX ORDER — POLYETHYLENE GLYCOL 3350 17 G/17G
17 POWDER, FOR SOLUTION ORAL DAILY PRN
Status: DISCONTINUED | OUTPATIENT
Start: 2025-04-29 | End: 2025-05-01 | Stop reason: HOSPADM

## 2025-04-29 RX ADMIN — Medication 10 MILLICURIE: at 13:21

## 2025-04-29 RX ADMIN — ATORVASTATIN CALCIUM 40 MG: 40 TABLET, FILM COATED ORAL at 21:04

## 2025-04-29 RX ADMIN — ENOXAPARIN SODIUM 80 MG: 100 INJECTION SUBCUTANEOUS at 21:04

## 2025-04-29 RX ADMIN — Medication: at 21:59

## 2025-04-29 RX ADMIN — TAMSULOSIN HYDROCHLORIDE 0.4 MG: 0.4 CAPSULE ORAL at 21:04

## 2025-04-29 RX ADMIN — SODIUM CHLORIDE, PRESERVATIVE FREE 10 ML: 5 INJECTION INTRAVENOUS at 21:04

## 2025-04-29 RX ADMIN — NITROGLYCERIN 0.4 MG: 0.4 TABLET SUBLINGUAL at 09:57

## 2025-04-29 ASSESSMENT — PAIN DESCRIPTION - ORIENTATION: ORIENTATION: MID

## 2025-04-29 ASSESSMENT — PAIN SCALES - GENERAL
PAINLEVEL_OUTOF10: 2
PAINLEVEL_OUTOF10: 3

## 2025-04-29 ASSESSMENT — LIFESTYLE VARIABLES: HOW OFTEN DO YOU HAVE A DRINK CONTAINING ALCOHOL: MONTHLY OR LESS

## 2025-04-29 ASSESSMENT — PAIN DESCRIPTION - LOCATION: LOCATION: CHEST

## 2025-04-29 NOTE — H&P
Department of Internal Medicine  History and Physical Examination     Primary Care Physician: Gabriel Traylor DO   Admitting Physician:  Michael Andrews DO  Admission date and time: 4/29/2025  9:14 AM    Room:  14/14  Admitting diagnosis: Chest pain with moderate risk for cardiac etiology [R07.9]    Patient Name: Javier Lawrence  MRN: [id number]    Date of Service: 4/29/2025     Chief Complaint: Chest pain    HISTORY OF PRESENT ILLNESS:    Javier Lawrence is an 89-year-old-year-old male patient presented to [facility] for evaluation of chest pain.  He describes the pain as being midsternal.  It is typically provoked by moderate exertional activity at baseline.  He was found to be bradycardic but hypertensive, afebrile.  EKG was interpreted as nonischemic by the ER team.  Chest x-ray showed no acute cardiopulmonary process.  Hiatal hernia was also incidentally identified.  CBC, metabolic panel unremarkable.  Troponin x 1 assessed with mild elevation at 23, repeat pending.  Case discussed with ER team.  Plan is for admission, further care and management under the service of Dr. Andrews.    Javier is seen and examined at bedside today.  He confirms the above given history.  No present chest pain.  We have reviewed the results of the workup and plan of care moving forward.  He has no prior established cardiologist and has not had a stress test in some time.  Otherwise no known sick contacts or exposures, fevers or chills.  Positive for mild sinus pressure and congestion with nasal congestion.  No sore throat, no difficulty swallowing.  Chest pain as above without palpitations or fluttering reported.  No significant shortness of breath at rest but does have some exertional dyspnea.  Congested at times without any significant cough or phlegm production reported and no hemoptysis.  No abdominal pain, nausea, vomiting, bowel pattern changes, hematochezia or melena reported.  He does not report any change in urinary pattern, frequency,  and negative were reviewed, aside from that all 12 systems were reviewed and negative.         PHYSICAL EXAM:  VITALS:  Vitals:    04/29/25 1215   BP: (!) 145/62   Pulse: 58   Resp: 17   Temp:    SpO2: 99%       General appearance:  Alert, responsive, oriented to person, place, and time.  Mildly uncomfortable in appearance, no distress.  Head:  Normocephalic. No masses, lesions or tenderness.  Eyes:  PERRLA.  EOMI.  Sclera clear.    ENT:  Ears normal. Mucosa normal.  Neck:    Supple. Trachea midline. No thyromegaly. No JVD. No bruits.  Heart:    Rhythm regular. Rate mildly bradycardic.  S1 and S2.  Lungs:    Symmetrical.  Normal air exchange.  Otherwise lungs are clear to auscultation bilaterally.  No wheezes. No rhonchi. No rales.  Abdomen:   Soft. Non-tender. Non-distended. Bowel sounds positive. No organomegaly or masses.  No pain on palpation.  Extremities:    Peripheral pulses present.  Trace bilateral lower extremity edema.  Neurologic:    Alert x 3.  No focal deficit.  Cranial nerves grossly intact. No focal weakness.  Psych:   Behavior is normal. Mood appears normal. Speech is not rapid and/or pressured.  Musculoskeletal:   No unilateral joint edema, erythema or warmth. Gait not assessed.  Integumentary:  No rashes  Skin normal color and texture.  Genitalia/Breast:  Deferred    LABORATORY DATA:  CBC with Differential:    Lab Results   Component Value Date/Time    WBC 5.1 04/29/2025 10:00 AM    RBC 3.84 04/29/2025 10:00 AM    HGB 12.5 04/29/2025 10:00 AM    HCT 37.9 04/29/2025 10:00 AM     04/29/2025 10:00 AM    MCV 98.7 04/29/2025 10:00 AM    MCH 32.6 04/29/2025 10:00 AM    MCHC 33.0 04/29/2025 10:00 AM    RDW 13.0 04/29/2025 10:00 AM    LYMPHOPCT 15 04/29/2025 10:00 AM    MONOPCT 11 04/29/2025 10:00 AM    EOSPCT 4 04/29/2025 10:00 AM    BASOPCT 1 04/29/2025 10:00 AM    MONOSABS 0.57 04/29/2025 10:00 AM    LYMPHSABS 0.78 04/29/2025 10:00 AM    EOSABS 0.19 04/29/2025 10:00 AM    BASOSABS 0.06

## 2025-04-29 NOTE — PROGRESS NOTES
4 Eyes Skin Assessment     NAME:  Javier Lawrence  YOB: 1935  MEDICAL RECORD NUMBER:  [id number]    The patient is being assessed for  Admission    I agree that at least one RN has performed a thorough Head to Toe Skin Assessment on the patient. ALL assessment sites listed below have been assessed.      Areas assessed by both nurses:    Head, Face, Ears, Shoulders, Back, Chest, Arms, Elbows, Hands, Sacrum. Buttock, Coccyx, Ischium, Legs. Feet and Heels, and Under Medical Devices skin intact        Does the Patient have a Wound? No noted wound(s)       Krishna Prevention initiated by RN: No  Wound Care Orders initiated by RN: No    Pressure Injury (Stage 3,4, Unstageable, DTI, NWPT, and Complex wounds) if present, place Wound referral order by RN under : No    New Ostomies, if present place, Ostomy referral order under : No     Nurse 1 eSignature: Electronically signed by Nancy Sams RN on 4/29/25 at 6:35 PM EDT    **SHARE this note so that the co-signing nurse can place an eSignature**    Nurse 2 eSignature: Electronically signed by Maryjane Burger RN on 4/29/25 at 6:36 PM EDT

## 2025-04-29 NOTE — ED PROVIDER NOTES
[facility] EMERGENCY DEPARTMENT  EMERGENCY DEPARTMENT ENCOUNTER        Pt Name: Javier Lawrence  MRN: [id number]  Birthdate 1935  Date of evaluation: 4/29/2025  Provider: Uche Boyle DO  PCP: Gabriel Traylor DO  Note Started: 9:23 AM EDT 4/29/25    CHIEF COMPLAINT       Chief Complaint   Patient presents with    Shortness of Breath    Chest Pain       HISTORY OF PRESENT ILLNESS: 1 or more Elements   History From: patient    Limitations to history : None    Javier Lawrence is a 89 y.o. male who presents to the ED for evaluation of chest pain and shortness of breath.  Patient states that it started around 815 this morning while attending [employer].  He states that the pain is significantly improved and now has a mild pressure which she rates a 2 out of 10.  He states pain significantly improved after he was given sublingual nitroglycerin and aspirin en route by EMS.  Patient reports no history of heart disease.  He is a non-smoker.  During the episode he was mildly short of breath.  Denied any nausea or vomiting.  Denied any sweatiness.  Pain did not radiate into his chest, neck, left arm, or back.  No associated abdominal pain.  Patient's medical history is significant for hypercholesteremia.    Nursing Notes were all reviewed and agreed with or any disagreements were addressed in the HPI.      REVIEW OF EXTERNAL NOTE :        REVIEW OF SYSTEMS :           Positives and Pertinent negatives as per HPI.     SURGICAL HISTORY     Past Surgical History:   Procedure Laterality Date    CARPAL TUNNEL RELEASE Left 07/14/2023    CARPAL TUNNEL RELEASE Right 09/05/2023    CATARACT REMOVAL  2020    CYSTOSCOPY  2015    HERNIA REPAIR Left 02/01/2022    LAPAROSCOPIC POSSIBLE OPEN LEFT INGUINAL HERNIA REPAIR WITH MESH performed by Tao Campos MD at [facility] OR    PROSTATECTOMY  2007    Laser        CURRENTMEDICATIONS       Previous Medications    ACETAMINOPHEN (TYLENOL) 500 MG TABLET    Take 1 tablet by  Regular rate. Regular rhythm. No murmurs, no gallops, no rubs.   Chest: No chest wall tenderness  GI:  Abdomen Soft, Non tender, Non distended.  No rebound, guarding, or rigidity. No pulsatile masses.  Musculoskeletal: Moves all extremities x 4. Warm and well perfused, no clubbing, no cyanosis, no edema. Capillary refill <3 seconds  Integument: skin warm and dry. No rashes.  No diaphoresis or pallor  Neurologic: GCS 15, no focal deficits,           DIAGNOSTIC RESULTS   LABS:    Labs Reviewed   BASIC METABOLIC PANEL - Abnormal; Notable for the following components:       Result Value    Glucose 105 (*)     All other components within normal limits   CBC WITH AUTO DIFFERENTIAL - Abnormal; Notable for the following components:    Lymphocytes % 15 (*)     Lymphocytes Absolute 0.78 (*)     All other components within normal limits   TROPONIN - Abnormal; Notable for the following components:    Troponin, High Sensitivity 23 (*)     All other components within normal limits   TROPONIN - Abnormal; Notable for the following components:    Troponin, High Sensitivity 43 (*)     All other components within normal limits   LEGIONELLA ANTIGEN, URINE   RESPIRATORY PANEL, MOLECULAR, WITH COVID-19   STREP PNEUMONIAE ANTIGEN   TROPONIN   TROPONIN   TROPONIN       As interpreted by me, the above displayed labs are abnormal. All other labs obtained during this visit were within normal range or not returned as of this dictation.      EKG Interpretation  Interpreted by emergency department physician, Uche Boyle DO      EKG Interpretation    Interpreted by emergency department physician    Rhythm: normal sinus   Rate: normal  Axis: left  Ectopy: none  Conduction: nonspecific interventricular conduction block  ST Segments: no acute change  T Waves: no acute change  Q Waves: none    Clinical Impression: no acute changes    Uhce Boyle DO          RADIOLOGY:   Non-plain film images such as CT, Ultrasound and MRI are read by the  release tablet 40 mEq (has no administration in time range)     Or   potassium bicarb-citric acid (EFFER-K) effervescent tablet 40 mEq (has no administration in time range)     Or   potassium chloride 10 mEq/100 mL IVPB (Peripheral Line) (has no administration in time range)   magnesium sulfate 2000 mg in 50 mL IVPB premix (has no administration in time range)   ondansetron (ZOFRAN-ODT) disintegrating tablet 4 mg (has no administration in time range)     Or   ondansetron (ZOFRAN) injection 4 mg (has no administration in time range)   acetaminophen (TYLENOL) tablet 650 mg (has no administration in time range)     Or   acetaminophen (TYLENOL) suppository 650 mg (has no administration in time range)   polyethylene glycol (GLYCOLAX) packet 17 g (has no administration in time range)   aspirin EC tablet 81 mg (81 mg Oral Not Given 4/29/25 1245)   docusate sodium (COLACE) capsule 100 mg (has no administration in time range)   levothyroxine (SYNTHROID) tablet 75 mcg (75 mcg Oral Not Given 4/29/25 1256)   therapeutic multivitamin-minerals 1 tablet (1 tablet Oral Not Given 4/29/25 1257)   pantoprazole (PROTONIX) tablet 20 mg (has no administration in time range)   saline nasal gel (AYR) ( Nasal Not Given 4/29/25 1256)   tamsulosin (FLOMAX) capsule 0.4 mg (has no administration in time range)   atorvastatin (LIPITOR) tablet 40 mg (has no administration in time range)   enoxaparin (LOVENOX) injection 80 mg (has no administration in time range)   technetium sestamibi (CARDIOLITE) injection 10 millicurie (10 millicuries IntraVENous Given 4/29/25 1321)             Medical Decision Making/Differential Diagnosis:    CC/HPI Summary, Social Determinants of health, Records Reviewed, DDx, testing done/not done, ED Course, Reassessment, disposition considerations/shared decision making with patient, consults, disposition:      ED Course as of 04/29/25 1321   Tue Apr 29, 2025   8225 Patient is currently chest pain-free after the

## 2025-04-30 LAB
ALBUMIN SERPL-MCNC: 3.6 G/DL (ref 3.5–5.2)
ALP SERPL-CCNC: 75 U/L (ref 40–129)
ALT SERPL-CCNC: 12 U/L (ref 0–40)
ANION GAP SERPL CALCULATED.3IONS-SCNC: 11 MMOL/L (ref 7–16)
AST SERPL-CCNC: 17 U/L (ref 0–39)
BASOPHILS # BLD: 0.08 K/UL (ref 0–0.2)
BASOPHILS NFR BLD: 2 % (ref 0–2)
BILIRUB DIRECT SERPL-MCNC: <0.2 MG/DL (ref 0–0.3)
BILIRUB INDIRECT SERPL-MCNC: NORMAL MG/DL (ref 0–1)
BILIRUB SERPL-MCNC: 0.6 MG/DL (ref 0–1.2)
BUN SERPL-MCNC: 16 MG/DL (ref 6–23)
CALCIUM SERPL-MCNC: 9.2 MG/DL (ref 8.6–10.2)
CHLORIDE SERPL-SCNC: 105 MMOL/L (ref 98–107)
CHOLEST SERPL-MCNC: 134 MG/DL
CO2 SERPL-SCNC: 23 MMOL/L (ref 22–29)
CREAT SERPL-MCNC: 1 MG/DL (ref 0.7–1.2)
ECHO BSA: 1.96 M2
EOSINOPHIL # BLD: 0.26 K/UL (ref 0.05–0.5)
EOSINOPHILS RELATIVE PERCENT: 5 % (ref 0–6)
ERYTHROCYTE [DISTWIDTH] IN BLOOD BY AUTOMATED COUNT: 13.1 % (ref 11.5–15)
GFR, ESTIMATED: 75 ML/MIN/1.73M2
GLUCOSE SERPL-MCNC: 87 MG/DL (ref 74–99)
HBA1C MFR BLD: 5.6 % (ref 4–5.6)
HCT VFR BLD AUTO: 39.9 % (ref 37–54)
HDLC SERPL-MCNC: 55 MG/DL
HGB BLD-MCNC: 12.9 G/DL (ref 12.5–16.5)
IMM GRANULOCYTES # BLD AUTO: <0.03 K/UL (ref 0–0.58)
IMM GRANULOCYTES NFR BLD: 0 % (ref 0–5)
L PNEUMO1 AG UR QL IA.RAPID: NEGATIVE
LDLC SERPL CALC-MCNC: 67 MG/DL
LYMPHOCYTES NFR BLD: 1.37 K/UL (ref 1.5–4)
LYMPHOCYTES RELATIVE PERCENT: 26 % (ref 20–42)
MAGNESIUM SERPL-MCNC: 2 MG/DL (ref 1.6–2.6)
MCH RBC QN AUTO: 31.9 PG (ref 26–35)
MCHC RBC AUTO-ENTMCNC: 32.3 G/DL (ref 32–34.5)
MCV RBC AUTO: 98.5 FL (ref 80–99.9)
MONOCYTES NFR BLD: 0.75 K/UL (ref 0.1–0.95)
MONOCYTES NFR BLD: 14 % (ref 2–12)
NEUTROPHILS NFR BLD: 54 % (ref 43–80)
NEUTS SEG NFR BLD: 2.84 K/UL (ref 1.8–7.3)
NUC STRESS EJECTION FRACTION: 56 %
PHOSPHATE SERPL-MCNC: 3.1 MG/DL (ref 2.5–4.5)
PLATELET # BLD AUTO: 193 K/UL (ref 130–450)
PMV BLD AUTO: 9.9 FL (ref 7–12)
POTASSIUM SERPL-SCNC: 4.2 MMOL/L (ref 3.5–5)
PROT SERPL-MCNC: 6.5 G/DL (ref 6.4–8.3)
RBC # BLD AUTO: 4.05 M/UL (ref 3.8–5.8)
S PNEUM AG SPEC QL: NEGATIVE
SODIUM SERPL-SCNC: 139 MMOL/L (ref 132–146)
SPECIMEN SOURCE: NORMAL
STRESS BASELINE DIAS BP: 83 MMHG
STRESS BASELINE HR: 59 BPM
STRESS BASELINE SYS BP: 151 MMHG
STRESS O2 SAT REST: 95 %
STRESS ST DEPRESSION: 0 MM
STRESS STAGE 1 BP: NORMAL MMHG
STRESS STAGE 1 COMMENTS: NORMAL
STRESS STAGE 1 DURATION: 1 MIN:SEC
STRESS STAGE 1 HR: 81 BPM
STRESS STAGE RECOVERY 1 BP: NORMAL MMHG
STRESS STAGE RECOVERY 1 COMMENTS: NORMAL
STRESS STAGE RECOVERY 1 DURATION: 1 MIN:SEC
STRESS STAGE RECOVERY 1 HR: 84 BPM
STRESS STAGE RECOVERY 2 BP: NORMAL MMHG
STRESS STAGE RECOVERY 2 COMMENTS: NORMAL
STRESS STAGE RECOVERY 2 DURATION: 1 MIN:SEC
STRESS STAGE RECOVERY 2 HR: 77 BPM
STRESS STAGE RECOVERY 3 BP: NORMAL MMHG
STRESS STAGE RECOVERY 3 COMMENTS: NORMAL
STRESS STAGE RECOVERY 3 DURATION: 1 MIN:SEC
STRESS STAGE RECOVERY 3 HR: 75 BPM
STRESS STAGE RECOVERY 4 BP: NORMAL MMHG
STRESS STAGE RECOVERY 4 COMMENTS: NORMAL
STRESS STAGE RECOVERY 4 DURATION: 1 MIN:SEC
STRESS STAGE RECOVERY 4 HR: 71 BPM
STRESS TARGET HR: 132 BPM
T4 FREE SERPL-MCNC: 1.1 NG/DL (ref 0.9–1.7)
TRIGL SERPL-MCNC: 58 MG/DL
TROPONIN I SERPL HS-MCNC: 42 NG/L (ref 0–22)
TSH SERPL DL<=0.05 MIU/L-ACNC: 3.27 UIU/ML (ref 0.27–4.2)
VLDLC SERPL CALC-MCNC: 12 MG/DL
WBC OTHER # BLD: 5.3 K/UL (ref 4.5–11.5)

## 2025-04-30 PROCEDURE — 84439 ASSAY OF FREE THYROXINE: CPT

## 2025-04-30 PROCEDURE — 6370000000 HC RX 637 (ALT 250 FOR IP): Performed by: NURSE PRACTITIONER

## 2025-04-30 PROCEDURE — 3430000000 HC RX DIAGNOSTIC RADIOPHARMACEUTICAL: Performed by: RADIOLOGY

## 2025-04-30 PROCEDURE — A9500 TC99M SESTAMIBI: HCPCS | Performed by: RADIOLOGY

## 2025-04-30 PROCEDURE — 83735 ASSAY OF MAGNESIUM: CPT

## 2025-04-30 PROCEDURE — 84100 ASSAY OF PHOSPHORUS: CPT

## 2025-04-30 PROCEDURE — 78452 HT MUSCLE IMAGE SPECT MULT: CPT | Performed by: INTERNAL MEDICINE

## 2025-04-30 PROCEDURE — 80061 LIPID PANEL: CPT

## 2025-04-30 PROCEDURE — 6360000002 HC RX W HCPCS: Performed by: NURSE PRACTITIONER

## 2025-04-30 PROCEDURE — 82248 BILIRUBIN DIRECT: CPT

## 2025-04-30 PROCEDURE — 84484 ASSAY OF TROPONIN QUANT: CPT

## 2025-04-30 PROCEDURE — 83036 HEMOGLOBIN GLYCOSYLATED A1C: CPT

## 2025-04-30 PROCEDURE — 36415 COLL VENOUS BLD VENIPUNCTURE: CPT

## 2025-04-30 PROCEDURE — 2500000003 HC RX 250 WO HCPCS: Performed by: NURSE PRACTITIONER

## 2025-04-30 PROCEDURE — 80053 COMPREHEN METABOLIC PANEL: CPT

## 2025-04-30 PROCEDURE — 85025 COMPLETE CBC W/AUTO DIFF WBC: CPT

## 2025-04-30 PROCEDURE — 84443 ASSAY THYROID STIM HORMONE: CPT

## 2025-04-30 PROCEDURE — 1200000000 HC SEMI PRIVATE

## 2025-04-30 RX ORDER — REGADENOSON 0.08 MG/ML
0.4 INJECTION, SOLUTION INTRAVENOUS
Status: CANCELLED | OUTPATIENT
Start: 2025-04-30

## 2025-04-30 RX ORDER — REGADENOSON 0.08 MG/ML
0.4 INJECTION, SOLUTION INTRAVENOUS
Status: COMPLETED | OUTPATIENT
Start: 2025-04-30 | End: 2025-04-30

## 2025-04-30 RX ORDER — TETRAKIS(2-METHOXYISOBUTYLISOCYANIDE)COPPER(I) TETRAFLUOROBORATE 1 MG/ML
30 INJECTION, POWDER, LYOPHILIZED, FOR SOLUTION INTRAVENOUS
Status: COMPLETED | OUTPATIENT
Start: 2025-04-30 | End: 2025-04-30

## 2025-04-30 RX ADMIN — Medication 30 MILLICURIE: at 12:44

## 2025-04-30 RX ADMIN — TAMSULOSIN HYDROCHLORIDE 0.4 MG: 0.4 CAPSULE ORAL at 21:04

## 2025-04-30 RX ADMIN — SODIUM CHLORIDE, PRESERVATIVE FREE 10 ML: 5 INJECTION INTRAVENOUS at 09:29

## 2025-04-30 RX ADMIN — PANTOPRAZOLE SODIUM 20 MG: 20 TABLET, DELAYED RELEASE ORAL at 14:40

## 2025-04-30 RX ADMIN — Medication: at 21:04

## 2025-04-30 RX ADMIN — Medication 1 TABLET: at 14:40

## 2025-04-30 RX ADMIN — SODIUM CHLORIDE, PRESERVATIVE FREE 10 ML: 5 INJECTION INTRAVENOUS at 21:04

## 2025-04-30 RX ADMIN — REGADENOSON 0.4 MG: 0.08 INJECTION, SOLUTION INTRAVENOUS at 12:39

## 2025-04-30 RX ADMIN — ENOXAPARIN SODIUM 80 MG: 100 INJECTION SUBCUTANEOUS at 21:03

## 2025-04-30 RX ADMIN — ATORVASTATIN CALCIUM 40 MG: 40 TABLET, FILM COATED ORAL at 21:05

## 2025-04-30 RX ADMIN — ENOXAPARIN SODIUM 80 MG: 100 INJECTION SUBCUTANEOUS at 09:24

## 2025-04-30 RX ADMIN — LEVOTHYROXINE SODIUM 75 MCG: 0.07 TABLET ORAL at 14:40

## 2025-04-30 ASSESSMENT — PAIN SCALES - GENERAL
PAINLEVEL_OUTOF10: 0

## 2025-04-30 NOTE — PROGRESS NOTES
Internal Medicine Consult Note    [facility]    Zaire López D.O., POLOIChi Andrews D.O., POLOIChi Traylor D.O.     James Nugent, MSN, APRN-CNP  Brandon Biggs, MSN, APRN-CNP  Aylin Garcia, MSN APRN-CNP  Velvet Núñez, MSN. APRN-NP-C     Primary Care Physician: Gabriel Traylor DO   Admitting Physician:  Michael Andrews DO  Admission date and time: 4/29/2025  9:14 AM    Room:  [address] [place] [address]  Admitting diagnosis: Acute systolic congestive heart failure (HCC) [I50.21]  Chest pain with moderate risk for cardiac etiology [R07.9]  Chest pain, unspecified type [R07.9]    Patient Name: Javier Lawrence  MRN: [id number]    Date of Service: 4/30/2025     Subjective:  Javier is a 89 y.o. male who was seen and examined today,4/30/2025, at the bedside.  Patient is resting comfortably in bed.  He currently denies any chest pain.  Patient stated that the chest pain he had when he came and would just not go away and was consistent in nature.  Patient is scheduled for a stress test today.    No family present during my examination.    Review of System:   Constitutional:   Denies fever or chills, weight loss or gain, positive fatigue  HEENT:   Denies ear pain, sore throat, sinus or eye problems.  Cardiovascular:   Denies any chest pain, irregular heartbeats, or palpitations.   Respiratory:   Denies shortness of breath, coughing, sputum production, hemoptysis, or wheezing.  Gastrointestinal:   Denies nausea, vomiting, diarrhea, or constipation.  Denies any abdominal pain.  Genitourinary:    Denies any urgency, frequency, hematuria. Voiding  without difficulty.  Extremities:   Denies lower extremity swelling, edema or cyanosis.   Neurology:    Denies any headache or focal neurological deficits, Denies generalized weakness or memory difficulty.   Psch:   Denies being anxious or depressed.  Musculoskeletal:    Denies  myalgias, joint complaints or back  pain.   Integumentary:   Denies any rashes, ulcers, or excoriations.  Denies bruising.  Hematologic/Lymphatic:  Denies bruising or bleeding.    Physical Exam:  I/O this shift:  In: 240 [P.O.:240]  Out: -     Intake/Output Summary (Last 24 hours) at 4/30/2025 1821  Last data filed at 4/30/2025 1319  Gross per 24 hour   Intake 360 ml   Output --   Net 360 ml   I/O last 3 completed shifts:  In: 120 [P.O.:120]  Out: -   Patient Vitals for the past 96 hrs (Last 3 readings):   Weight   04/29/25 1802 81 kg (178 lb 9 oz)   04/29/25 1518 83.9 kg (185 lb)   04/29/25 1505 83.9 kg (185 lb)     Vital Signs:   Blood pressure (!) 143/76, pulse 76, temperature 97.3 °F (36.3 °C), temperature source Oral, resp. rate 20, height 1.702 m (5' 7\"), weight 81 kg (178 lb 9 oz), SpO2 98%.    General appearance:  Alert, responsive, oriented to person, place, and time. Well preserved, alert, no distress.  Head:  Normocephalic. No masses, lesions or tenderness.  Eyes:  PERRLA.  EOMI.  Sclera clear.  Buccal mucosa moist.  ENT:  Ears normal. Mucosa normal.  Neck:    Supple. Trachea midline. No thyromegaly. No JVD. No bruits.  Heart:    Rhythm regular. Rate controlled.  No murmurs.  Lungs:    Symmetrical. Clear to auscultation bilaterally.  No wheezes. No rhonchi. No rales.  Abdomen:   Soft. Non-tender. Non-distended. Bowel sounds positive. No organomegaly or masses.  No pain on palpation.  Extremities:    Peripheral pulses present.  No peripheral edema.  No ulcers. No cyanosis. No clubbing.  Neurologic:    Alert x 3.  No focal deficit.  Cranial nerves grossly intact. No focal weakness.  Psych:   Behavior is normal. Mood appears normal. Speech is not rapid and/or pressured.  Musculoskeletal:   Spine ROM normal. Muscular strength intact. Gait not assessed.  Integumentary:  No rashes  Skin normal color and texture.  Genitalia/Breast:  Deferred    Medication:  Scheduled Meds:   sodium chloride flush  5-40 mL IntraVENous 2 times per day    aspirin  81

## 2025-04-30 NOTE — ACP (ADVANCE CARE PLANNING)
Advance Care Planning   Healthcare Decision Maker:    Primary Decision Maker: Barb Lawrence - Spouse - [phone]    Secondary Decision Maker: John Lawrence - Child - [phone]

## 2025-04-30 NOTE — PROGRESS NOTES
Spiritual Health History and Assessment/Progress Note  [facility]     Encounter, Attempted Encounter (Patient was unavailable for a visit),  ,  ,      Name: Javier Lawrence MRN: [id number]    Age: 89 y.o.     Sex: male   Language: English   Hindu: Anabaptist   NSTEMI (non-ST elevated myocardial infarction) (HCC)     Date: 4/30/2025                           Spiritual Assessment began in [address] SURG TELE        Referral/Consult From: Rounding   Encounter Overview/Reason:  Encounter, Attempted Encounter (Patient was unavailable for a visit)  Service Provided For: Patient    Emani, Belief, Meaning:   Patient unable to assess at this time  Family/Friends No family/friends present      Importance and Influence:  Patient unable to assess at this time  Family/Friends No family/friends present    Community:  Patient Other: Unable to assess  Family/Friends No family/friends present    Assessment and Plan of Care:     Patient Interventions include: Other: None  Family/Friends Interventions include: No family/friends present    Patient Plan of Care: Spiritual Care available upon further referral  Family/Friends Plan of Care: Spiritual Care available upon further referral    Electronically signed by Chaplain Gloria on 4/30/2025 at 2:33 PM

## 2025-04-30 NOTE — CARE COORDINATION
Case Management Assessment  Initial Evaluation    Date/Time of Evaluation: 4/30/2025 11:59 AM  Assessment Completed by: Veronika Pedro RN    If patient is discharged prior to next notation, then this note serves as note for discharge by case management.    Patient Name: Javier Lawrence                   YOB: 1935  Diagnosis: Acute systolic congestive heart failure (HCC) [I50.21]  Chest pain with moderate risk for cardiac etiology [R07.9]  Chest pain, unspecified type [R07.9]                   Date / Time: 4/29/2025  9:14 AM    Patient Admission Status: Inpatient   Readmission Risk (Low < 19, Mod (19-27), High > 27): Readmission Risk Score: 8.3    Current PCP: Gabriel Traylor, DO  PCP verified by CM? Yes    Chart Reviewed: Yes      History Provided by: Patient  Patient Orientation: Alert and Oriented, Person, Place, Situation    Patient Cognition: Alert    Hospitalization in the last 30 days (Readmission):  No      Advance Directives:      Code Status: Full Code   Patient's Primary Decision Maker is: Legal Next of Kin    Primary Decision Maker: Barb Lawrence - Spouse - [phone]    Secondary Decision Maker: John Lawrence - Child - [phone]    Discharge Planning:    Patient lives with: Spouse/Significant Other Type of Home: House  Primary Care Giver: Self  Patient Support Systems include: Spouse/Significant Other, Children   Current Financial resources:    Current community resources:    Current services prior to admission: None            Current DME:              Type of Home Care services:  None    ADLS  Prior functional level: Independent in ADLs/IADLs  Current functional level: Independent in ADLs/IADLs      Family can provide assistance at DC: Yes  Would you like Case Management to discuss the discharge plan with any other family members/significant others, and if so, who? No  Plans to Return to Present Housing: Yes    Potential Assistance needed at discharge: N/A            Potential DME:    Patient

## 2025-04-30 NOTE — CONSULTS
Today's Date: 4/30/2025  Patient Name: Javier Lawrence  Date of admission: 4/29/2025  9:14 AM  Patient's age: 89 y.o., 1935male    Admission Dx: Acute systolic congestive heart failure (HCC) [I50.21]  Chest pain with moderate risk for cardiac etiology [R07.9]  Chest pain, unspecified type [R07.9]    Requesting Physician: Michael Andrews DO    Chief Complaint   Patient presents with    Shortness of Breath    Chest Pain       HISTORY OF PRESENT ILLNESS:      89-year-old gentleman that has no known previous cardiac problems, presented to the hospital secondary to severe episode of angina at home that lasted more than 2 hours.  He has been having classic exertional angina off and on for the past 3 months.  He never experienced pain at rest until this last event.  He also has dyspnea with exertion.  Troponin was elevated above 42.  EKG showed sinus rhythm with left bundle branch block.      REVIEW OF SYSTEMS:    A comprehensive review of systems is negative except for what is reported above      Past Medical History:   has a past medical history of Acquired hypothyroidism, Basosquamous carcinoma, BPH (benign prostatic hyperplasia), Epididymo-orchitis, H/O degenerative disc disease, Hearing difficulty, bilateral, Hyperlipidemia, Mixed basal-squamous cell carcinoma, Spinal stenosis of lumbar region, Spinal stenosis of lumbar region without neurogenic claudication, Stricture of urethral meatus in male, and Varicose veins of both lower extremities.    Past Surgical History:   has a past surgical history that includes Prostatectomy (2007); Cystoscopy (2015); Cataract removal (2020); hernia repair (Left, 02/01/2022); Carpal tunnel release (Left, 07/14/2023); and Carpal tunnel release (Right, 09/05/2023).     Social History:   reports that he has never smoked. He has never been exposed to tobacco smoke. He has never used smokeless tobacco. He reports current alcohol use. He reports that he does not use drugs.     Family History:  option of doing a pharmacological nuclear stress test if he is reluctant to have invasive evaluation considering his advanced age.  He is mentally and physically in good condition for his age and he is independent and is able to drive his own car.  After discussing with him we decided to proceed with a pharmacological nuclear stress test for risk stratification and maximize his antianginal therapy.  Further risk stratification will depend on results of stress test.  2D echo Doppler showed good LV function          Pedro Og MD, MD, FACC

## 2025-04-30 NOTE — PROGRESS NOTES
Nuclear stress performed by patient. Results awaited. No complications. See final report when done.  Incidentally, his pre-test symptoms do sound like angina. Wilmer Quintana M.D., FACC,FACP

## 2025-05-01 VITALS
BODY MASS INDEX: 28.02 KG/M2 | DIASTOLIC BLOOD PRESSURE: 70 MMHG | HEIGHT: 67 IN | SYSTOLIC BLOOD PRESSURE: 147 MMHG | WEIGHT: 178.56 LBS | OXYGEN SATURATION: 96 % | RESPIRATION RATE: 18 BRPM | TEMPERATURE: 97.2 F | HEART RATE: 67 BPM

## 2025-05-01 LAB
ALBUMIN SERPL-MCNC: 3.6 G/DL (ref 3.5–5.2)
ALP SERPL-CCNC: 70 U/L (ref 40–129)
ALT SERPL-CCNC: 12 U/L (ref 0–40)
ANION GAP SERPL CALCULATED.3IONS-SCNC: 10 MMOL/L (ref 7–16)
AST SERPL-CCNC: 17 U/L (ref 0–39)
BASOPHILS # BLD: 0.07 K/UL (ref 0–0.2)
BASOPHILS NFR BLD: 1 % (ref 0–2)
BILIRUB DIRECT SERPL-MCNC: <0.2 MG/DL (ref 0–0.3)
BILIRUB INDIRECT SERPL-MCNC: NORMAL MG/DL (ref 0–1)
BILIRUB SERPL-MCNC: 0.7 MG/DL (ref 0–1.2)
BUN SERPL-MCNC: 12 MG/DL (ref 6–23)
CALCIUM SERPL-MCNC: 9.2 MG/DL (ref 8.6–10.2)
CHLORIDE SERPL-SCNC: 104 MMOL/L (ref 98–107)
CO2 SERPL-SCNC: 25 MMOL/L (ref 22–29)
CREAT SERPL-MCNC: 0.8 MG/DL (ref 0.7–1.2)
EOSINOPHIL # BLD: 0.25 K/UL (ref 0.05–0.5)
EOSINOPHILS RELATIVE PERCENT: 5 % (ref 0–6)
ERYTHROCYTE [DISTWIDTH] IN BLOOD BY AUTOMATED COUNT: 12.8 % (ref 11.5–15)
GFR, ESTIMATED: 84 ML/MIN/1.73M2
GLUCOSE SERPL-MCNC: 91 MG/DL (ref 74–99)
HCT VFR BLD AUTO: 38.9 % (ref 37–54)
HGB BLD-MCNC: 12.9 G/DL (ref 12.5–16.5)
IMM GRANULOCYTES # BLD AUTO: <0.03 K/UL (ref 0–0.58)
IMM GRANULOCYTES NFR BLD: 0 % (ref 0–5)
LYMPHOCYTES NFR BLD: 1.11 K/UL (ref 1.5–4)
LYMPHOCYTES RELATIVE PERCENT: 23 % (ref 20–42)
MAGNESIUM SERPL-MCNC: 2 MG/DL (ref 1.6–2.6)
MCH RBC QN AUTO: 31.9 PG (ref 26–35)
MCHC RBC AUTO-ENTMCNC: 33.2 G/DL (ref 32–34.5)
MCV RBC AUTO: 96.3 FL (ref 80–99.9)
MONOCYTES NFR BLD: 0.68 K/UL (ref 0.1–0.95)
MONOCYTES NFR BLD: 14 % (ref 2–12)
NEUTROPHILS NFR BLD: 57 % (ref 43–80)
NEUTS SEG NFR BLD: 2.74 K/UL (ref 1.8–7.3)
PHOSPHATE SERPL-MCNC: 3 MG/DL (ref 2.5–4.5)
PLATELET # BLD AUTO: 194 K/UL (ref 130–450)
PMV BLD AUTO: 9.9 FL (ref 7–12)
POTASSIUM SERPL-SCNC: 4 MMOL/L (ref 3.5–5)
PROT SERPL-MCNC: 6.5 G/DL (ref 6.4–8.3)
RBC # BLD AUTO: 4.04 M/UL (ref 3.8–5.8)
SODIUM SERPL-SCNC: 139 MMOL/L (ref 132–146)
WBC OTHER # BLD: 4.9 K/UL (ref 4.5–11.5)

## 2025-05-01 PROCEDURE — 80053 COMPREHEN METABOLIC PANEL: CPT

## 2025-05-01 PROCEDURE — 83735 ASSAY OF MAGNESIUM: CPT

## 2025-05-01 PROCEDURE — 6370000000 HC RX 637 (ALT 250 FOR IP): Performed by: NURSE PRACTITIONER

## 2025-05-01 PROCEDURE — 85025 COMPLETE CBC W/AUTO DIFF WBC: CPT

## 2025-05-01 PROCEDURE — 2500000003 HC RX 250 WO HCPCS: Performed by: NURSE PRACTITIONER

## 2025-05-01 PROCEDURE — 84100 ASSAY OF PHOSPHORUS: CPT

## 2025-05-01 PROCEDURE — 6370000000 HC RX 637 (ALT 250 FOR IP): Performed by: INTERNAL MEDICINE

## 2025-05-01 PROCEDURE — 36415 COLL VENOUS BLD VENIPUNCTURE: CPT

## 2025-05-01 PROCEDURE — 82248 BILIRUBIN DIRECT: CPT

## 2025-05-01 RX ORDER — ISOSORBIDE MONONITRATE 30 MG/1
30 TABLET, EXTENDED RELEASE ORAL DAILY
Qty: 30 TABLET | Refills: 0 | Status: SHIPPED | OUTPATIENT
Start: 2025-05-01

## 2025-05-01 RX ORDER — CLOPIDOGREL BISULFATE 75 MG/1
75 TABLET ORAL DAILY
Qty: 30 TABLET | Refills: 0 | Status: SHIPPED | OUTPATIENT
Start: 2025-05-01

## 2025-05-01 RX ORDER — METOPROLOL SUCCINATE 25 MG/1
25 TABLET, EXTENDED RELEASE ORAL DAILY
Qty: 30 TABLET | Refills: 0 | Status: SHIPPED | OUTPATIENT
Start: 2025-05-01

## 2025-05-01 RX ORDER — CLOPIDOGREL BISULFATE 75 MG/1
300 TABLET ORAL ONCE
Status: COMPLETED | OUTPATIENT
Start: 2025-05-01 | End: 2025-05-01

## 2025-05-01 RX ORDER — METOPROLOL SUCCINATE 25 MG/1
25 TABLET, EXTENDED RELEASE ORAL DAILY
Status: DISCONTINUED | OUTPATIENT
Start: 2025-05-01 | End: 2025-05-01 | Stop reason: HOSPADM

## 2025-05-01 RX ORDER — ATORVASTATIN CALCIUM 40 MG/1
40 TABLET, FILM COATED ORAL NIGHTLY
Qty: 30 TABLET | Refills: 0 | Status: SHIPPED | OUTPATIENT
Start: 2025-05-01

## 2025-05-01 RX ORDER — ASPIRIN 81 MG/1
81 TABLET ORAL DAILY
COMMUNITY
Start: 2025-05-01

## 2025-05-01 RX ORDER — ISOSORBIDE MONONITRATE 30 MG/1
30 TABLET, EXTENDED RELEASE ORAL DAILY
Status: DISCONTINUED | OUTPATIENT
Start: 2025-05-01 | End: 2025-05-01 | Stop reason: HOSPADM

## 2025-05-01 RX ADMIN — SODIUM CHLORIDE, PRESERVATIVE FREE 10 ML: 5 INJECTION INTRAVENOUS at 09:28

## 2025-05-01 RX ADMIN — ASPIRIN 81 MG: 81 TABLET, COATED ORAL at 09:28

## 2025-05-01 RX ADMIN — PANTOPRAZOLE SODIUM 20 MG: 20 TABLET, DELAYED RELEASE ORAL at 05:42

## 2025-05-01 RX ADMIN — Medication 1 TABLET: at 09:28

## 2025-05-01 RX ADMIN — METOPROLOL SUCCINATE 25 MG: 25 TABLET, EXTENDED RELEASE ORAL at 09:28

## 2025-05-01 RX ADMIN — CLOPIDOGREL BISULFATE 300 MG: 75 TABLET, FILM COATED ORAL at 06:33

## 2025-05-01 RX ADMIN — ISOSORBIDE MONONITRATE 30 MG: 30 TABLET, EXTENDED RELEASE ORAL at 09:28

## 2025-05-01 RX ADMIN — LEVOTHYROXINE SODIUM 75 MCG: 0.07 TABLET ORAL at 05:42

## 2025-05-01 NOTE — CARE COORDINATION
5/1/2025 1003 CM note: Pt is independent and resides with his wife. Stress test completed on 4/30/25. Plan is for pt to return home-await cardiology plan of care. Lamin EVANS

## 2025-05-01 NOTE — DISCHARGE INSTRUCTIONS
Your information:  Name: Javier Lawrence  : 1935    Your instructions:    YOU ARE BEING DISCHARGED HOME. PLEASE MAKE AND KEEP YOUR FOLLOW UP APPOINTMENTS.     What to do after you leave the hospital:    Recommended diet: Regular    Low Fat/Low Chol/High Fiber/CHIVO       Recommended activity: activity as tolerated        The following personal items were collected during your admission and were returned to you:    Belongings  Dental Appliances: None  Vision - Corrective Lenses: Eyeglasses  Hearing Aid: Bilateral hearing aids, At home  Clothing: Belt, Footwear, Pants, Shirt, Socks, Undergarments, At bedside  Jewelry: Watch  Body Piercings Removed: N/A  Electronic Devices: At home  Weapons (Notify Protective Services/Security): None  Other Valuables: Sent home  Home Medications: None  Valuables Given To: Patient (clothes)  Provide Name(s) of Who Valuable(s) Were Given To: pt  Responsible person(s) in the waiting room: na  Patient approves for provider to speak to responsible person post operatively: Yes    Information obtained by:  By signing below, I understand that if any problems occur once I leave the hospital I am to contact Dr. Traylor.  I understand and acknowledge receipt of the instructions indicated above.

## 2025-05-01 NOTE — PROGRESS NOTES
Date:  5/1/2025  Patient: Javier Lawrence  Admission:  4/29/2025  9:14 AM  Admit DX: Acute systolic congestive heart failure (HCC) [I50.21]  Chest pain with moderate risk for cardiac etiology [R07.9]  Chest pain, unspecified type [R07.9]  Age:  89 y.o., 1935     LOS: 2 days       SUBJECTIVE:    Patient seen and examined  He is sitting up in the chair  Denies any cardiac symptoms.  Vitals are stable    OBJECTIVE:    BP (!) 147/70   Pulse 67   Temp 97.2 °F (36.2 °C) (Oral)   Resp 18   Ht 1.702 m (5' 7\")   Wt 81 kg (178 lb 9 oz)   SpO2 96%   BMI 27.97 kg/m²     Intake/Output Summary (Last 24 hours) at 5/1/2025 1018  Last data filed at 4/30/2025 1319  Gross per 24 hour   Intake 240 ml   Output --   Net 240 ml       EXAM:       General: Alert and oriented, No acute distress.  Appears as stated age.  Eye:  Pupils are equal, round and reactive to light, Extraocular movements are intact, Normal conjunctiva.    Head: Normocephalic, Normal hearing, Oral mucosa is moist.  Neck: Supple, No carotid bruit, No jugular venous distention, No lymphadenopathy.  Respiratory: Lungs are clear to auscultation, Respirations are non-labored, Breath sounds are equal, Symmetrical chest wall expansion.  Cardiovascular: Normal rate, No murmur, No gallop, Good pulses equal in all extremities, No edema.  Gastrointestinal: Soft, Non-tender, Non-distended, Normal bowel sounds.  Musculoskeletal: Normal strength, No tenderness, No deformity.  Integumentary:  Warm, Dry.    Neurologic: Alert, Oriented, No focal deficits.  Cognition and Speech: Oriented, Speech clear and coherent.  Psychiatric: Cooperative, Appropriate mood & affect, Normal judgment.        Current Inpatient Medications:   metoprolol succinate  25 mg Oral Daily    isosorbide mononitrate  30 mg Oral Daily    sodium chloride flush  5-40 mL IntraVENous 2 times per day    aspirin  81 mg Oral Daily    levothyroxine  75 mcg Oral Daily    therapeutic multivitamin-minerals  1 tablet  Oral Daily    pantoprazole  20 mg Oral QAM AC    saline nasal gel   Nasal BID    tamsulosin  0.4 mg Oral Daily    atorvastatin  40 mg Oral Nightly       IV Infusions (if any):   sodium chloride           Labs:   CBC:   Recent Labs     04/30/25  0641 05/01/25  0711   WBC 5.3 4.9   HGB 12.9 12.9   HCT 39.9 38.9    194     BMP:   Recent Labs     04/30/25  0641 05/01/25  0711    139   K 4.2 4.0   CO2 23 25   BUN 16 12   CREATININE 1.0 0.8   LABGLOM 75 84   GLUCOSE 87 91     BNP: No results for input(s): \"BNP\" in the last 72 hours.  PT/INR: No results for input(s): \"PROTIME\", \"INR\" in the last 72 hours.  APTT:No results for input(s): \"APTT\" in the last 72 hours.  CARDIAC ENZYMES:No results for input(s): \"CKTOTAL\", \"CKMB\", \"CKMBINDEX\", \"TROPONINI\" in the last 72 hours.  FASTING LIPID PANEL:  Lab Results   Component Value Date/Time    HDL 55 04/30/2025 06:41 AM    TRIG 58 04/30/2025 06:41 AM     LIVER PROFILE:  Recent Labs     04/30/25  0641 05/01/25  0711   AST 17 17   ALT 12 12     High Sensitivity Troponin: No results found for: \"TROPONINIHS\"      04/29/25    ECHO (TTE) COMPLETE (PRN CONTRAST/BUBBLE/STRAIN/3D) 04/29/2025  5:59 PM (Final)    Interpretation Summary    Left Ventricle: Normal left ventricular systolic function with EF 60%. Left ventricle size is normal. Mild concentric hypertrophy. Inferobasal wall appears aneurysmal. Normal diastolic function.    Right Ventricle: Right ventricle size is normal. Normal systolic function. TAPSE is normal.    Tricuspid Valve: Moderate regurgitation, RVSP estimated 44-49mmHg.    No prior study.    Signed by: Tsering Sanford MD on 4/29/2025  5:59 PM      04/29/25    NM STRESS TEST WITH MYOCARDIAL PERFUSION 04/30/2025  2:18 PM, 04/30/2025  4:06 PM (Final)    Interpretation Summary    Stress Combined Conclusion: Findings suggest a low to intermediate risk of cardiac events.  Clinical correlation is advised    Stress Function: Post-stress ejection fraction is

## 2025-05-01 NOTE — DISCHARGE SUMMARY
[address] [address]                            DISCHARGE SUMMARY      PATIENT NAME: TANK PARKER                  : 1935  MED REC NO: [id number]                        ROOM: 0424  ACCOUNT NO: [id number]                       ADMIT DATE: 2025  PROVIDER: Zaire López DO      ADMITTING DIAGNOSIS:  Chest pain.    FINAL DIAGNOSIS:  Chest pain with abnormal stress test with the patient refusing cardiac catheterization.    SECONDARY DIAGNOSES:  Symptomatic bradycardia, primary hypothyroidism, hyperlipidemia, spinal stenosis in lumbar region with neurogenic claudication, benign prostatic hypertrophy with lower urinary tract symptoms.    COMPLICATIONS:  Abnormal stress test.    OPERATION PERFORMED:  None.    CONSULTATIONS OBTAINED:  Dr. Quintana, Dr. Og.  No OMT was given.    ADDITIONAL ADMITTING PHYSICIAN:  Dr. López.    CHIEF COMPLAINT AND HISTORY OF CHIEF COMPLAINT:  This is an 89-year-old male who was admitted to [facility]. The patient presented to the hospital here on 2025.  The patient presented to the hospital here with what appeared to be chest pain.  The patient presented to the hospital for evaluation of chest pain.  He describes the pain as midsternal in nature.  The pain was typically provoked by moderate exertional activity.  The patient was found to be bradycardic  EKG at that time showed nonischemic in the emergency room.  The patient was seen and evaluated, hiatal hernia at that time was present.  The patient was admitted to the hospital here with a diagnosis of chest pain, rule out MI, cardiac evaluation.    PAST MEDICAL HISTORY:  Positive for history of primary hypothyroidism, basal cell carcinoma, benign prostatic hypertrophy, hearing deficit, hyperlipidemia, spinal stenosis, varicosities.    PHYSICAL EXAMINATION:  VITAL SIGNS:  Blood pressure 145/62, pulse 58, respirations 17, O2

## 2025-05-03 LAB
EKG ATRIAL RATE: 60 BPM
EKG P AXIS: 72 DEGREES
EKG P-R INTERVAL: 156 MS
EKG Q-T INTERVAL: 424 MS
EKG QRS DURATION: 130 MS
EKG QTC CALCULATION (BAZETT): 424 MS
EKG R AXIS: -17 DEGREES
EKG T AXIS: 48 DEGREES
EKG VENTRICULAR RATE: 60 BPM

## 2025-05-08 ENCOUNTER — HOSPITAL ENCOUNTER (OUTPATIENT)
Dept: PREADMISSION TESTING | Age: 89
Discharge: HOME | End: 2025-05-08

## 2025-05-08 VITALS — HEIGHT: 66 IN | BODY MASS INDEX: 28.77 KG/M2 | WEIGHT: 179 LBS

## 2025-05-08 RX ORDER — SODIUM CHLORIDE 0.9 % (FLUSH) 0.9 %
5-40 SYRINGE (ML) INJECTION PRN
Status: CANCELLED | OUTPATIENT
Start: 2025-05-09

## 2025-05-08 RX ORDER — SODIUM CHLORIDE 9 MG/ML
INJECTION, SOLUTION INTRAVENOUS CONTINUOUS
Status: CANCELLED | OUTPATIENT
Start: 2025-05-09

## 2025-05-08 NOTE — PROGRESS NOTES
[facility]                                                                                                                    PRE OP INSTRUCTIONS FOR  Javier Lawrence        Date: 5/8/2025    Date of surgery: 5/9/25   Arrival Time: Hospital will call you between 5pm and 7pm with your final arrival time for surgery. Go to front of hospital and check in at information desk.    Nothing by mouth (NPO) as instructed. May have clear liquids up to 2 hours prior to surgery. Nothing solid after midnight. Examples: water, apple juice, black coffee, plain tea    Take the following medications with a small sip of water on the morning of Surgery: Levothyroxine, ASA, Isosorbide, Metoprolol, Clodipogrel     Diabetics may take half the evening dose of insulin but none after midnight.  If you feel symptomatic or have low blood sugar morning of surgery drink 1-2 ounces of apple juice only. If you take a weekly insulin injection _______________, stop 7 days prior to surgery. If you take _______________, stop 3-4 days prior to surgery.    Aspirin, Ibuprofen, Advil, Naproxen, other Anti-inflammatory products should be stopped before surgery as directed by your surgeon, cardiologist, or primary care Doctor. Herbal supplements and Vitamin E should be stopped five days prior.  May take Tylenol unless instructed otherwise by your surgeon.    Check with your Doctor regarding stopping Plavix, Coumadin, Lovenox, Eliquis, Effient, or other blood thinners such as, pradaxa, lixiana, xaralto and savaysa.    Do not smoke, chew tobacco, vape, or use illicit drugs and do not drink any alcoholic beverages 24 hours prior to surgery.    You may brush your teeth the morning of surgery.      You MUST make arrangements for a responsible adult, 18 and over, to take you home after your surgery. You will not be allowed to leave alone or drive yourself home.  You will need someone stay with you the first 24 hrs. Your surgery will be

## 2025-05-09 ENCOUNTER — HOSPITAL ENCOUNTER (OUTPATIENT)
Age: 89
Setting detail: OBSERVATION
Discharge: HOME | End: 2025-05-10
Attending: INTERNAL MEDICINE | Admitting: INTERNAL MEDICINE
Payer: MEDICARE

## 2025-05-09 DIAGNOSIS — I25.10 CAD (CORONARY ARTERY DISEASE): ICD-10-CM

## 2025-05-09 PROBLEM — R93.1 ABNORMAL FINDINGS ON CARDIAC CATHETERIZATION: Status: ACTIVE | Noted: 2025-05-09

## 2025-05-09 LAB
ANION GAP SERPL CALCULATED.3IONS-SCNC: 11 MMOL/L (ref 7–16)
BUN SERPL-MCNC: 18 MG/DL (ref 6–23)
CALCIUM SERPL-MCNC: 8.9 MG/DL (ref 8.6–10.2)
CHLORIDE SERPL-SCNC: 105 MMOL/L (ref 98–107)
CO2 SERPL-SCNC: 24 MMOL/L (ref 22–29)
CREAT SERPL-MCNC: 1.1 MG/DL (ref 0.7–1.2)
ECHO BSA: 1.94 M2
EKG ATRIAL RATE: 59 BPM
EKG P AXIS: 87 DEGREES
EKG P-R INTERVAL: 158 MS
EKG Q-T INTERVAL: 454 MS
EKG QRS DURATION: 142 MS
EKG QTC CALCULATION (BAZETT): 449 MS
EKG R AXIS: -9 DEGREES
EKG T AXIS: 65 DEGREES
EKG VENTRICULAR RATE: 59 BPM
GFR, ESTIMATED: 67 ML/MIN/1.73M2
GLUCOSE SERPL-MCNC: 96 MG/DL (ref 74–99)
INR PPP: 1.1
POTASSIUM SERPL-SCNC: 4.3 MMOL/L (ref 3.5–5)
PROTHROMBIN TIME: 12.2 SEC (ref 9.3–12.4)
SODIUM SERPL-SCNC: 140 MMOL/L (ref 132–146)

## 2025-05-09 PROCEDURE — 99153 MOD SED SAME PHYS/QHP EA: CPT | Performed by: INTERNAL MEDICINE

## 2025-05-09 PROCEDURE — 7100000010 HC PHASE II RECOVERY - FIRST 15 MIN: Performed by: INTERNAL MEDICINE

## 2025-05-09 PROCEDURE — 6370000000 HC RX 637 (ALT 250 FOR IP): Performed by: INTERNAL MEDICINE

## 2025-05-09 PROCEDURE — 7100000011 HC PHASE II RECOVERY - ADDTL 15 MIN: Performed by: INTERNAL MEDICINE

## 2025-05-09 PROCEDURE — C1725 CATH, TRANSLUMIN NON-LASER: HCPCS | Performed by: INTERNAL MEDICINE

## 2025-05-09 PROCEDURE — 6360000004 HC RX CONTRAST MEDICATION: Performed by: INTERNAL MEDICINE

## 2025-05-09 PROCEDURE — 99152 MOD SED SAME PHYS/QHP 5/>YRS: CPT | Performed by: INTERNAL MEDICINE

## 2025-05-09 PROCEDURE — 80048 BASIC METABOLIC PNL TOTAL CA: CPT

## 2025-05-09 PROCEDURE — 2500000003 HC RX 250 WO HCPCS: Performed by: INTERNAL MEDICINE

## 2025-05-09 PROCEDURE — 93005 ELECTROCARDIOGRAM TRACING: CPT | Performed by: INTERNAL MEDICINE

## 2025-05-09 PROCEDURE — C1874 STENT, COATED/COV W/DEL SYS: HCPCS | Performed by: INTERNAL MEDICINE

## 2025-05-09 PROCEDURE — C1769 GUIDE WIRE: HCPCS | Performed by: INTERNAL MEDICINE

## 2025-05-09 PROCEDURE — C1887 CATHETER, GUIDING: HCPCS | Performed by: INTERNAL MEDICINE

## 2025-05-09 PROCEDURE — G0378 HOSPITAL OBSERVATION PER HR: HCPCS

## 2025-05-09 PROCEDURE — 92928 PRQ TCAT PLMT NTRAC ST 1 LES: CPT | Performed by: INTERNAL MEDICINE

## 2025-05-09 PROCEDURE — 93458 L HRT ARTERY/VENTRICLE ANGIO: CPT | Performed by: INTERNAL MEDICINE

## 2025-05-09 PROCEDURE — 85610 PROTHROMBIN TIME: CPT

## 2025-05-09 PROCEDURE — 85347 COAGULATION TIME ACTIVATED: CPT

## 2025-05-09 PROCEDURE — 7100000000 HC PACU RECOVERY - FIRST 15 MIN: Performed by: INTERNAL MEDICINE

## 2025-05-09 PROCEDURE — 6360000002 HC RX W HCPCS: Performed by: INTERNAL MEDICINE

## 2025-05-09 PROCEDURE — C1894 INTRO/SHEATH, NON-LASER: HCPCS | Performed by: INTERNAL MEDICINE

## 2025-05-09 PROCEDURE — 2709999900 HC NON-CHARGEABLE SUPPLY: Performed by: INTERNAL MEDICINE

## 2025-05-09 DEVICE — STENT ONYXNG27538UX ONYX 2.75X38RX
Type: IMPLANTABLE DEVICE | Status: FUNCTIONAL
Brand: ONYX FRONTIER™

## 2025-05-09 DEVICE — STENT ONYXNG22512UX ONYX 2.25X12RX
Type: IMPLANTABLE DEVICE | Status: FUNCTIONAL
Brand: ONYX FRONTIER™

## 2025-05-09 RX ORDER — PANTOPRAZOLE SODIUM 20 MG/1
20 TABLET, DELAYED RELEASE ORAL
Status: DISCONTINUED | OUTPATIENT
Start: 2025-05-10 | End: 2025-05-10 | Stop reason: HOSPADM

## 2025-05-09 RX ORDER — SODIUM CHLORIDE 9 MG/ML
INJECTION, SOLUTION INTRAVENOUS PRN
Status: DISCONTINUED | OUTPATIENT
Start: 2025-05-09 | End: 2025-05-10 | Stop reason: HOSPADM

## 2025-05-09 RX ORDER — LEVOTHYROXINE SODIUM 75 UG/1
75 TABLET ORAL DAILY
Status: DISCONTINUED | OUTPATIENT
Start: 2025-05-10 | End: 2025-05-10 | Stop reason: HOSPADM

## 2025-05-09 RX ORDER — TAMSULOSIN HYDROCHLORIDE 0.4 MG/1
0.4 CAPSULE ORAL DAILY
Status: DISCONTINUED | OUTPATIENT
Start: 2025-05-09 | End: 2025-05-10 | Stop reason: HOSPADM

## 2025-05-09 RX ORDER — CLOPIDOGREL BISULFATE 75 MG/1
75 TABLET ORAL DAILY
Status: DISCONTINUED | OUTPATIENT
Start: 2025-05-10 | End: 2025-05-10 | Stop reason: HOSPADM

## 2025-05-09 RX ORDER — LIDOCAINE HCL/PF 100 MG/5ML
SYRINGE (ML) INJECTION PRN
Status: DISCONTINUED | OUTPATIENT
Start: 2025-05-09 | End: 2025-05-09 | Stop reason: HOSPADM

## 2025-05-09 RX ORDER — ACETAMINOPHEN 500 MG
500 TABLET ORAL EVERY 6 HOURS PRN
Status: DISCONTINUED | OUTPATIENT
Start: 2025-05-09 | End: 2025-05-10 | Stop reason: HOSPADM

## 2025-05-09 RX ORDER — DOCUSATE SODIUM 100 MG/1
100 CAPSULE, LIQUID FILLED ORAL 2 TIMES DAILY
Status: DISCONTINUED | OUTPATIENT
Start: 2025-05-09 | End: 2025-05-10 | Stop reason: HOSPADM

## 2025-05-09 RX ORDER — IOPAMIDOL 755 MG/ML
INJECTION, SOLUTION INTRAVASCULAR PRN
Status: DISCONTINUED | OUTPATIENT
Start: 2025-05-09 | End: 2025-05-09 | Stop reason: HOSPADM

## 2025-05-09 RX ORDER — ATORVASTATIN CALCIUM 40 MG/1
40 TABLET, FILM COATED ORAL NIGHTLY
Status: DISCONTINUED | OUTPATIENT
Start: 2025-05-09 | End: 2025-05-10 | Stop reason: HOSPADM

## 2025-05-09 RX ORDER — SODIUM CHLORIDE 9 MG/ML
INJECTION, SOLUTION INTRAVENOUS CONTINUOUS
Status: DISCONTINUED | OUTPATIENT
Start: 2025-05-09 | End: 2025-05-09 | Stop reason: HOSPADM

## 2025-05-09 RX ORDER — ISOSORBIDE MONONITRATE 30 MG/1
30 TABLET, EXTENDED RELEASE ORAL DAILY
Status: DISCONTINUED | OUTPATIENT
Start: 2025-05-10 | End: 2025-05-10 | Stop reason: HOSPADM

## 2025-05-09 RX ORDER — SODIUM CHLORIDE 0.9 % (FLUSH) 0.9 %
5-40 SYRINGE (ML) INJECTION EVERY 12 HOURS SCHEDULED
Status: DISCONTINUED | OUTPATIENT
Start: 2025-05-09 | End: 2025-05-10 | Stop reason: HOSPADM

## 2025-05-09 RX ORDER — SODIUM CHLORIDE 0.9 % (FLUSH) 0.9 %
5-40 SYRINGE (ML) INJECTION PRN
Status: DISCONTINUED | OUTPATIENT
Start: 2025-05-09 | End: 2025-05-10 | Stop reason: HOSPADM

## 2025-05-09 RX ORDER — CLOPIDOGREL BISULFATE 75 MG/1
TABLET ORAL PRN
Status: DISCONTINUED | OUTPATIENT
Start: 2025-05-09 | End: 2025-05-09 | Stop reason: HOSPADM

## 2025-05-09 RX ORDER — ASPIRIN 81 MG/1
81 TABLET ORAL DAILY
Status: DISCONTINUED | OUTPATIENT
Start: 2025-05-10 | End: 2025-05-10 | Stop reason: HOSPADM

## 2025-05-09 RX ORDER — METOPROLOL SUCCINATE 25 MG/1
25 TABLET, EXTENDED RELEASE ORAL DAILY
Status: DISCONTINUED | OUTPATIENT
Start: 2025-05-10 | End: 2025-05-10 | Stop reason: HOSPADM

## 2025-05-09 RX ORDER — VERAPAMIL HYDROCHLORIDE 2.5 MG/ML
INJECTION INTRAVENOUS PRN
Status: DISCONTINUED | OUTPATIENT
Start: 2025-05-09 | End: 2025-05-09 | Stop reason: HOSPADM

## 2025-05-09 RX ORDER — FENTANYL CITRATE 50 UG/ML
INJECTION, SOLUTION INTRAMUSCULAR; INTRAVENOUS PRN
Status: DISCONTINUED | OUTPATIENT
Start: 2025-05-09 | End: 2025-05-09 | Stop reason: HOSPADM

## 2025-05-09 RX ORDER — ACETAMINOPHEN 325 MG/1
650 TABLET ORAL EVERY 4 HOURS PRN
Status: DISCONTINUED | OUTPATIENT
Start: 2025-05-09 | End: 2025-05-09

## 2025-05-09 RX ORDER — SODIUM CHLORIDE 0.9 % (FLUSH) 0.9 %
5-40 SYRINGE (ML) INJECTION PRN
Status: DISCONTINUED | OUTPATIENT
Start: 2025-05-09 | End: 2025-05-09 | Stop reason: HOSPADM

## 2025-05-09 RX ORDER — MIDAZOLAM HYDROCHLORIDE 1 MG/ML
INJECTION, SOLUTION INTRAMUSCULAR; INTRAVENOUS PRN
Status: DISCONTINUED | OUTPATIENT
Start: 2025-05-09 | End: 2025-05-09 | Stop reason: HOSPADM

## 2025-05-09 RX ADMIN — ATORVASTATIN CALCIUM 40 MG: 40 TABLET, FILM COATED ORAL at 21:23

## 2025-05-09 RX ADMIN — SODIUM CHLORIDE, PRESERVATIVE FREE 10 ML: 5 INJECTION INTRAVENOUS at 21:23

## 2025-05-09 RX ADMIN — DOCUSATE SODIUM 100 MG: 100 CAPSULE, LIQUID FILLED ORAL at 21:23

## 2025-05-09 ASSESSMENT — PAIN DESCRIPTION - LOCATION: LOCATION: CHEST

## 2025-05-09 ASSESSMENT — PAIN SCALES - GENERAL: PAINLEVEL_OUTOF10: 2

## 2025-05-09 ASSESSMENT — PAIN - FUNCTIONAL ASSESSMENT
PAIN_FUNCTIONAL_ASSESSMENT: NONE - DENIES PAIN
PAIN_FUNCTIONAL_ASSESSMENT: 0-10

## 2025-05-09 ASSESSMENT — PAIN DESCRIPTION - DESCRIPTORS: DESCRIPTORS: PRESSURE

## 2025-05-09 NOTE — PROGRESS NOTES
4 Eyes Skin Assessment     NAME:  Javier Lawrence  YOB: 1935  MEDICAL RECORD NUMBER:  [id number]    The patient is being assessed for  Admission    I agree that at least one RN has performed a thorough Head to Toe Skin Assessment on the patient. ALL assessment sites listed below have been assessed.      Areas assessed by both nurses:    Head, Face, Ears, Shoulders, Back, Chest, Arms, Elbows, Hands, Sacrum. Buttock, Coccyx, Ischium, Legs. Feet and Heels, and Under Medical Devices         Does the Patient have a Wound? No noted wound(s)       Krishna Prevention initiated by RN: No  Wound Care Orders initiated by RN: No    Pressure Injury (Stage 3,4, Unstageable, DTI, NWPT, and Complex wounds) if present, place Wound referral order by RN under : No    New Ostomies, if present place, Ostomy referral order under : No     Nurse 1 eSignature: Electronically signed by Susi Triplett RN on 5/9/25 at 4:44 PM EDT    **SHARE this note so that the co-signing nurse can place an eSignature**    Nurse 2 eSignature: Electronically signed by Rosita Cortes RN on 5/9/25 at 4:45 PM EDT

## 2025-05-09 NOTE — PROGRESS NOTES
Patient came down to special procedures for cardiac catheterization with Dr. Og.       Total amount of sedation medication given during procedure: 1 mg of IV Versed and 50 mcg of IV Fentanyl    18 cc of air in right Radial band and Radial pulse is 2.  Right wrist to remain straight, patient not to bend wrist.    1042 - 15 minute post procedure /66 67 14 98% on RA    Right hand color pink,  pulse of 2+, no numbness or tingling     Patient is alert and oriented x 4     nurse to nurse called, spoke with Lisandra RN, nurse notified of above information.  Nurse assumed post sedation vital signs    Patient transported back to the [facility].

## 2025-05-09 NOTE — CONSULTS
Met with patient and discussed that their physician has ordered a referral to our outpatient Phase II Cardiac Rehabilitation program. Reviewed the benefits of cardiac rehabilitation based on their diagnosis and personal risk factors. Patient demonstrates moderate interest in Cardiac Rehabilitation at this time.  Cardiac Rehabilitation brochure provided to patient/family. The Cardiac Rehabilitation Program has been provided the patient's referral information and pertinent patient details and history. The patient may call [facility] Cardiac Rehabilitation at [phone] for additional information or questions. Contact information for [facility] Cardiac Rehabilitation and other choices close to the patient's residence have been provided in the discharge instructions so that the patient may call and schedule an appointment when cleared by their physician. Thank you for the referral.

## 2025-05-09 NOTE — DISCHARGE INSTRUCTIONS
Cardiac Rehabilitation: Discharge instructions        Cardiac rehabilitation is a program for people who have a heart problem, such as a heart attack, coronary stent placed, heart failure, or a heart valve disease. The program includes exercise, lifestyle changes, education, and emotional support. Cardiac rehab can help you improve the quality of your life through better overall health. It can help you lose weight and feel better about yourself.    On your cardiac rehab team, you may have your doctor, a nurse specialist, an exercise physiologist, and a dietitian. They will design your cardiac rehab program specifically for you. You will learn how to reduce your risk for heart problems, how to manage stress, and how to eat a heart-healthy diet. By the end of the program, you will be ready to maintain a healthier lifestyle on your own.    Follow-up care is a key part of your treatment and safety. Be sure to make and go to all appointments, and call your doctor if you are having problems. It's also a good idea to know your test results and keep a list of the medicines you take.    Please call to schedule your first appointment once you have been cleared by your cardiologist to attend Phase II Outpatient Cardiac Rehabilitation.       Cardiac Rehabilitation Options:  [facility]  Cardiac Rehab  [address], [place]                                                                                                   P- [phone]                                                                                           Hours: M/W/F 7am-5pm T/Th 7-3    [facility] Cardiac Rehab             [facility]  [address]                                                                                          Cardiology Services  [place], [place]

## 2025-05-09 NOTE — PROGRESS NOTES
Called report to NATHAN Goldman. Patient transported to 514-02  with patient on monitor with nurse and transport. Patient transported to bed and placed on monitor. Charge nurse with me at bedside. Patients call light on patients lap.

## 2025-05-10 VITALS
DIASTOLIC BLOOD PRESSURE: 72 MMHG | HEIGHT: 66 IN | TEMPERATURE: 98.4 F | HEART RATE: 57 BPM | WEIGHT: 178 LBS | OXYGEN SATURATION: 95 % | SYSTOLIC BLOOD PRESSURE: 130 MMHG | BODY MASS INDEX: 28.61 KG/M2 | RESPIRATION RATE: 20 BRPM

## 2025-05-10 LAB
ANION GAP SERPL CALCULATED.3IONS-SCNC: 9 MMOL/L (ref 7–16)
BASOPHILS # BLD: 0.08 K/UL (ref 0–0.2)
BASOPHILS NFR BLD: 1 % (ref 0–2)
BUN SERPL-MCNC: 14 MG/DL (ref 6–23)
CALCIUM SERPL-MCNC: 8.6 MG/DL (ref 8.6–10.2)
CHLORIDE SERPL-SCNC: 104 MMOL/L (ref 98–107)
CO2 SERPL-SCNC: 24 MMOL/L (ref 22–29)
CREAT SERPL-MCNC: 0.9 MG/DL (ref 0.7–1.2)
EKG ATRIAL RATE: 56 BPM
EKG P AXIS: 69 DEGREES
EKG P-R INTERVAL: 168 MS
EKG Q-T INTERVAL: 418 MS
EKG QRS DURATION: 134 MS
EKG QTC CALCULATION (BAZETT): 403 MS
EKG R AXIS: 1 DEGREES
EKG T AXIS: 37 DEGREES
EKG VENTRICULAR RATE: 56 BPM
EOSINOPHIL # BLD: 0.25 K/UL (ref 0.05–0.5)
EOSINOPHILS RELATIVE PERCENT: 4 % (ref 0–6)
ERYTHROCYTE [DISTWIDTH] IN BLOOD BY AUTOMATED COUNT: 12.9 % (ref 11.5–15)
GFR, ESTIMATED: 80 ML/MIN/1.73M2
GLUCOSE SERPL-MCNC: 88 MG/DL (ref 74–99)
HCT VFR BLD AUTO: 36.5 % (ref 37–54)
HGB BLD-MCNC: 12 G/DL (ref 12.5–16.5)
IMM GRANULOCYTES # BLD AUTO: <0.03 K/UL (ref 0–0.58)
IMM GRANULOCYTES NFR BLD: 0 % (ref 0–5)
LYMPHOCYTES NFR BLD: 0.98 K/UL (ref 1.5–4)
LYMPHOCYTES RELATIVE PERCENT: 14 % (ref 20–42)
MCH RBC QN AUTO: 31.7 PG (ref 26–35)
MCHC RBC AUTO-ENTMCNC: 32.9 G/DL (ref 32–34.5)
MCV RBC AUTO: 96.3 FL (ref 80–99.9)
MONOCYTES NFR BLD: 0.9 K/UL (ref 0.1–0.95)
MONOCYTES NFR BLD: 13 % (ref 2–12)
NEUTROPHILS NFR BLD: 69 % (ref 43–80)
NEUTS SEG NFR BLD: 4.89 K/UL (ref 1.8–7.3)
PLATELET # BLD AUTO: 190 K/UL (ref 130–450)
PMV BLD AUTO: 10.1 FL (ref 7–12)
POTASSIUM SERPL-SCNC: 3.9 MMOL/L (ref 3.5–5)
RBC # BLD AUTO: 3.79 M/UL (ref 3.8–5.8)
SODIUM SERPL-SCNC: 137 MMOL/L (ref 132–146)
WBC OTHER # BLD: 7.1 K/UL (ref 4.5–11.5)

## 2025-05-10 PROCEDURE — 6370000000 HC RX 637 (ALT 250 FOR IP): Performed by: INTERNAL MEDICINE

## 2025-05-10 PROCEDURE — 80048 BASIC METABOLIC PNL TOTAL CA: CPT

## 2025-05-10 PROCEDURE — 85025 COMPLETE CBC W/AUTO DIFF WBC: CPT

## 2025-05-10 PROCEDURE — 2500000003 HC RX 250 WO HCPCS: Performed by: INTERNAL MEDICINE

## 2025-05-10 PROCEDURE — 36415 COLL VENOUS BLD VENIPUNCTURE: CPT

## 2025-05-10 PROCEDURE — G0378 HOSPITAL OBSERVATION PER HR: HCPCS

## 2025-05-10 PROCEDURE — 93005 ELECTROCARDIOGRAM TRACING: CPT | Performed by: INTERNAL MEDICINE

## 2025-05-10 RX ADMIN — PANTOPRAZOLE SODIUM 20 MG: 20 TABLET, DELAYED RELEASE ORAL at 05:46

## 2025-05-10 RX ADMIN — TAMSULOSIN HYDROCHLORIDE 0.4 MG: 0.4 CAPSULE ORAL at 08:46

## 2025-05-10 RX ADMIN — ASPIRIN 81 MG: 81 TABLET, COATED ORAL at 08:46

## 2025-05-10 RX ADMIN — DOCUSATE SODIUM 100 MG: 100 CAPSULE, LIQUID FILLED ORAL at 08:46

## 2025-05-10 RX ADMIN — CLOPIDOGREL BISULFATE 75 MG: 75 TABLET, FILM COATED ORAL at 08:46

## 2025-05-10 RX ADMIN — METOPROLOL SUCCINATE 25 MG: 25 TABLET, FILM COATED, EXTENDED RELEASE ORAL at 08:46

## 2025-05-10 RX ADMIN — LEVOTHYROXINE SODIUM 75 MCG: 0.07 TABLET ORAL at 08:56

## 2025-05-10 RX ADMIN — ISOSORBIDE MONONITRATE 30 MG: 30 TABLET, EXTENDED RELEASE ORAL at 08:46

## 2025-05-10 RX ADMIN — SODIUM CHLORIDE, PRESERVATIVE FREE 10 ML: 5 INJECTION INTRAVENOUS at 08:56

## 2025-05-10 ASSESSMENT — PAIN SCALES - GENERAL: PAINLEVEL_OUTOF10: 0

## 2025-05-10 NOTE — PROGRESS NOTES
Right armboard removed. Tegaderm removed area all soft, small amt ecchymosis towards the thumb. Denies any discomfort. Bandaid applied. Verbalizes understanding of discharge instructions.

## 2025-05-10 NOTE — PROGRESS NOTES
Spiritual Health History and Assessment/Progress Note  [facility]    Initial Encounter, Spiritual/Emotional Needs,  ,  ,      Name: Javier Lawrence MRN: [id number]    Age: 89 y.o.     Sex: male   Language: English   Pentecostal: Buddhist   Abnormal findings on cardiac catheterization     Date: 5/10/2025                           Spiritual Assessment began in [facility] PIC/ICU        Referral/Consult From: Rounding   Encounter Overview/Reason: Initial Encounter, Spiritual/Emotional Needs  Service Provided For: Patient    Emani, Belief, Meaning:   Patient is connected with a emani tradition or spiritual practice  Family/Friends No family/friends present      Importance and Influence:  Patient has spiritual/personal beliefs that influence decisions regarding their health  Family/Friends No family/friends present    Community:  Patient is connected with a spiritual community  Family/Friends No family/friends present    Assessment and Plan of Care:     Patient Interventions include: Facilitated expression of thoughts and feelings  Family/Friends Interventions include: No family/friends present    Patient Plan of Care: Spiritual Care available upon further referral  Family/Friends Plan of Care: No family/friends present    Electronically signed by Chaplain Andie on 5/10/2025 at 11:37 AM

## 2025-05-10 NOTE — PLAN OF CARE
Problem: Discharge Planning  Goal: Discharge to home or other facility with appropriate resources  5/10/2025 0412 by Brayden Ernst RN  Outcome: Progressing     Problem: Pain  Goal: Verbalizes/displays adequate comfort level or baseline comfort level  5/10/2025 0412 by Brayden Ernst RN  Outcome: Progressing     Problem: Safety - Adult  Goal: Free from fall injury  5/10/2025 0412 by Brayden Ernst RN  Outcome: Progressing     Problem: ABCDS Injury Assessment  Goal: Absence of physical injury  5/10/2025 0412 by Brayden Ernst RN  Outcome: Progressing     Problem: Discharge Planning  Goal: Discharge to home or other facility with appropriate resources  5/10/2025 0412 by Brayden Ernst RN  Outcome: Progressing     Problem: Pain  Goal: Verbalizes/displays adequate comfort level or baseline comfort level  5/10/2025 0412 by Brayden Ernst RN  Outcome: Progressing     Problem: Safety - Adult  Goal: Free from fall injury  5/10/2025 0412 by Brayden Ernst RN  Outcome: Progressing     Problem: ABCDS Injury Assessment  Goal: Absence of physical injury  5/10/2025 0412 by Brayden Ernst RN  Outcome: Progressing

## 2025-05-10 NOTE — PLAN OF CARE
Problem: Discharge Planning  Goal: Discharge to home or other facility with appropriate resources  5/10/2025 1104 by Jessica Kincaid, RN  Outcome: Progressing  5/10/2025 0412 by Brayden Ernst, RN  Outcome: Progressing     Problem: Pain  Goal: Verbalizes/displays adequate comfort level or baseline comfort level  5/10/2025 1104 by Jessica Kincaid RN  Outcome: Progressing  5/10/2025 0412 by Brayden Ernst, RN  Outcome: Progressing     Problem: Safety - Adult  Goal: Free from fall injury  5/10/2025 1104 by Jessica Kincaid RN  Outcome: Progressing  5/10/2025 0412 by Brayden Ernst, RN  Outcome: Progressing     Problem: ABCDS Injury Assessment  Goal: Absence of physical injury  5/10/2025 1104 by Jessica Kincaid, RN  Outcome: Progressing  5/10/2025 0412 by Brayden Ernst, RN  Outcome: Progressing

## 2025-05-10 NOTE — DISCHARGE SUMMARY
Discharge date: May 10, 2025  Admission date: May 9, 2025    Discharge diagnosis: Critical coronary disease of LAD/diagonal.  Unstable angina.  Post successful PCI and kissing stents of LAD/diagonal    History of present illness and hospital course:    89-year-old gentleman who presented to the hospital on April 29, 2025 with acute coronary syndrome, NSTEMI, and progressive crescendo angina with minimal exertion.  He had a nuclear stress test that showed large anterior wall reversible ischemia.  He was recommended cardiac catheterization and PCI at that time and he declined.  He was treated medically with beta-blocker, isosorbide, statin drug, aspirin, and Plavix.  After he was discharged home he called my office and changed his mind deciding to have the procedure done secondary to his persistent symptoms.  The patient was brought electively yesterday for catheterization/PCI  The catheterization showed severe subtotal 99% proximal diagonal and 80% mid LAD.  Both lesions were stented successfully.  The rest of his coronaries were patent.  LV function was normal.  Because of his advanced age and since the procedure was done a little late during the day we decided to admit him observation overnight.  He is doing fairly well today and is asymptomatic  He will be discharged on his previous home meds.  I gave instruction to his wife that he needs not to miss any Plavix or aspirin in a day otherwise he could have stent thrombosis.  He will follow-up with me in 3 to 4 weeks.              CC Dr. Og    **This report was transcribed using voice recognition software. Every effort was made to ensure accuracy; however, inadvertent computerized transcription errors may be present.

## 2025-05-10 NOTE — PLAN OF CARE
Problem: Discharge Planning  Goal: Discharge to home or other facility with appropriate resources  5/10/2025 0412 by Brayden Ernst RN  Outcome: Progressing  5/9/2025 1630 by Susi Triplett RN  Outcome: Progressing     Problem: Pain  Goal: Verbalizes/displays adequate comfort level or baseline comfort level  5/10/2025 0412 by Brayden Ernst RN  Outcome: Progressing  5/9/2025 1630 by Susi Triplett RN  Outcome: Progressing     Problem: Safety - Adult  Goal: Free from fall injury  5/10/2025 0412 by Brayden Ernst RN  Outcome: Progressing  5/9/2025 1630 by Susi Triplett RN  Outcome: Progressing     Problem: ABCDS Injury Assessment  Goal: Absence of physical injury  5/10/2025 0412 by Brayden Ernst RN  Outcome: Progressing  5/9/2025 1630 by Susi Triplett RN  Outcome: Progressing

## 2025-05-12 LAB — ACTIVATED CLOTTING TIME, LOW RANGE: >400 SEC

## 2025-05-14 NOTE — PROGRESS NOTES
Physician Progress Note      PATIENT:               TANK PARKER  CSN #:                  [id number]  :                       1935  ADMIT DATE:       2025 9:14 AM  DISCH DATE:        2025 11:45 AM  RESPONDING  PROVIDER #:        Zaire López DO          QUERY TEXT:    Chest pain is documented in the medical record DS Note by Zaire López,   2025. Please specify the underlying cause:    The clinical indicators include:  89 y.o. male that has no known previous cardiac problems, presented to the   hospital secondary to severe episode of angina at home that lasted more than 2   hours found to have LBBB and abn stress test.  - FDx: Chest pain with abnormal stress test with the patient refusing cardiac   cath. HC: Stress test came back at that time, which was abn, showing evidence   of low to intermediate risk.  Because of the finding at that time showing high   abnormalities, Dr. Og did feel the patient would require cardiac   catheterization.  Unfortunately, at that time he declined. The patient was   discharged on aspirin 81 mg daily, atorvastatin 40 daily, Plavix 75 daily,   Imdur 30 mg daily, metoprolol 25 mg 1 tablet daily. (DS Note by Zaire Shankar DO on 2025)  - ASSESSMENT: ACS, NSTEMI, Abnormal EKG, Abn nuclear stress test. Patient has   large ischemia in the distribution of LAD. I strongly recommended invasive   evaluation in the form of cardiac catheterization/PCI. (Cardiology PN by   Pedro Og MD on 2025)  -HPI: presented to the hospital secondary to severe episode of angina at home   that lasted more than 2 hours. IMP: NSTEMI, REC.: The patient has very classic   symptoms of crescendo angina that culminated to severe chest pain at rest   that lasted 2 hours and was associated with acute elevation of troponin. This   is very predictive of significant coronary disease. (Cardiology CN by Pedro Og MD on 2025)  - NM STRESS TEST WITH MYOCARDIAL

## 2025-06-13 ENCOUNTER — HOSPITAL ENCOUNTER (OUTPATIENT)
Dept: CARDIAC REHAB | Age: 89
Setting detail: THERAPIES SERIES
Discharge: HOME | End: 2025-06-13
Payer: MEDICARE

## 2025-06-13 PROCEDURE — 93797 PHYS/QHP OP CAR RHAB WO ECG: CPT

## 2025-06-13 PROCEDURE — 93798 PHYS/QHP OP CAR RHAB W/ECG: CPT

## 2025-06-13 ASSESSMENT — PATIENT HEALTH QUESTIONNAIRE - PHQ9
3. TROUBLE FALLING OR STAYING ASLEEP: SEVERAL DAYS
SUM OF ALL RESPONSES TO PHQ QUESTIONS 1-9: 2
9. THOUGHTS THAT YOU WOULD BE BETTER OFF DEAD, OR OF HURTING YOURSELF: NOT AT ALL
7. TROUBLE CONCENTRATING ON THINGS, SUCH AS READING THE NEWSPAPER OR WATCHING TELEVISION: NOT AT ALL
4. FEELING TIRED OR HAVING LITTLE ENERGY: SEVERAL DAYS
1. LITTLE INTEREST OR PLEASURE IN DOING THINGS: NOT AT ALL
8. MOVING OR SPEAKING SO SLOWLY THAT OTHER PEOPLE COULD HAVE NOTICED. OR THE OPPOSITE, BEING SO FIGETY OR RESTLESS THAT YOU HAVE BEEN MOVING AROUND A LOT MORE THAN USUAL: NOT AT ALL
2. FEELING DOWN, DEPRESSED OR HOPELESS: NOT AT ALL
SUM OF ALL RESPONSES TO PHQ QUESTIONS 1-9: 2
6. FEELING BAD ABOUT YOURSELF - OR THAT YOU ARE A FAILURE OR HAVE LET YOURSELF OR YOUR FAMILY DOWN: NOT AT ALL
SUM OF ALL RESPONSES TO PHQ QUESTIONS 1-9: 2
10. IF YOU CHECKED OFF ANY PROBLEMS, HOW DIFFICULT HAVE THESE PROBLEMS MADE IT FOR YOU TO DO YOUR WORK, TAKE CARE OF THINGS AT HOME, OR GET ALONG WITH OTHER PEOPLE: NOT DIFFICULT AT ALL
5. POOR APPETITE OR OVEREATING: NOT AT ALL
SUM OF ALL RESPONSES TO PHQ QUESTIONS 1-9: 2

## 2025-06-13 NOTE — PROGRESS NOTES
06/13/25 1100   Treatment Diagnosis   Treatment Diagnosis 1 PCI   PCI Date 05/09/25   Referral Date 06/09/25   Significant Cardiovascular History Previous myocardial infarction   NYHA Heart Failure Classification   (NA)   Individual Treatment Plan   ITP Visit Type Initial assessment   ITP Next Review Date 07/03/25   Visit #/Total Visits 2/36   EF% 55 %  (Per Note from Heart Cath on 5/19/25)   Risk Stratification Low   Supplemental Oxygen   Oxygen Use No   Exercise Assessment   Stages of Change Preparation   DASI Total Score 34.7   Assisted Devices None   Test Exercise tolerance test   Data Measured Before Test   Heart Rate 75   Resting Blood Pressure 118/68   SpO2 98   O2 Device Room air   RPD 0   Data Measured During Test   Indicate Mode of RPE 3 minutes   Modality Stepper   Stepper Monge 20   Stepper RPM 75   Symptoms   (Pt denies)   Problems While Exercising None   Describe Type of Pain You Are Having NA   Blood Pressure 148/68   Lowest SpO2 94 %   O2 Device Room air   RPD 0   RPE 12   Data Measured at Peak   Distance Calculated in  mi   Distance (miles) 1.02   Distance (Feet-Calculated) 5385.6 ft   Peak Heart Rate 83   Peak Blood Pressure 146/62   Peak RPE 13   SpO2 98   Data Measured at 5 Minutes After Test   Heart Rate 51   Blood Pressure 108/66   RPD 0   SpO2 98 %   O2 Device Room air   Comments Patient successfully completed the seated exercise tolerance test w/o stops, breaks or rests. Pt denies any symptoms of CP, SOB, dizziness or fatigue.   Exercise Intervention/Prescription   Mode Stepper;Ergometer;Bike;Treadmill   Frequency per week 2-3x/week   Duration Per Session 30 mins to start   Intensity METS       2-3   Target RPE 9-14   Progression Patient to increase time on each modality by 3-5 minutes as tolerated until next 30 day ITP review.   Symptoms with Exercise Denies   Target Heart Rate   (101-130 (-20%))   Resistance Training No   Exercise Activity at Home   Type Gardening   Exercise

## 2025-06-17 ENCOUNTER — HOSPITAL ENCOUNTER (OUTPATIENT)
Dept: CARDIAC REHAB | Age: 89
Setting detail: THERAPIES SERIES
Discharge: HOME | End: 2025-06-17
Payer: MEDICARE

## 2025-06-17 PROCEDURE — 93798 PHYS/QHP OP CAR RHAB W/ECG: CPT

## 2025-06-19 ENCOUNTER — HOSPITAL ENCOUNTER (OUTPATIENT)
Dept: CARDIAC REHAB | Age: 89
Setting detail: THERAPIES SERIES
Discharge: HOME | End: 2025-06-19
Payer: MEDICARE

## 2025-06-19 PROCEDURE — 93798 PHYS/QHP OP CAR RHAB W/ECG: CPT

## 2025-06-24 ENCOUNTER — HOSPITAL ENCOUNTER (OUTPATIENT)
Dept: CARDIAC REHAB | Age: 89
Setting detail: THERAPIES SERIES
Discharge: HOME | End: 2025-06-24
Payer: MEDICARE

## 2025-06-24 PROCEDURE — 93798 PHYS/QHP OP CAR RHAB W/ECG: CPT

## 2025-06-26 ENCOUNTER — HOSPITAL ENCOUNTER (OUTPATIENT)
Dept: CARDIAC REHAB | Age: 89
Setting detail: THERAPIES SERIES
Discharge: HOME | End: 2025-06-26
Payer: MEDICARE

## 2025-06-26 PROCEDURE — 93798 PHYS/QHP OP CAR RHAB W/ECG: CPT

## 2025-07-01 ENCOUNTER — HOSPITAL ENCOUNTER (OUTPATIENT)
Dept: CARDIAC REHAB | Age: 89
Setting detail: THERAPIES SERIES
Discharge: HOME | End: 2025-07-01
Payer: MEDICARE

## 2025-07-01 PROCEDURE — 93798 PHYS/QHP OP CAR RHAB W/ECG: CPT

## 2025-07-01 ASSESSMENT — PATIENT HEALTH QUESTIONNAIRE - PHQ9
7. TROUBLE CONCENTRATING ON THINGS, SUCH AS READING THE NEWSPAPER OR WATCHING TELEVISION: SEVERAL DAYS
8. MOVING OR SPEAKING SO SLOWLY THAT OTHER PEOPLE COULD HAVE NOTICED. OR THE OPPOSITE, BEING SO FIGETY OR RESTLESS THAT YOU HAVE BEEN MOVING AROUND A LOT MORE THAN USUAL: NOT AT ALL
SUM OF ALL RESPONSES TO PHQ QUESTIONS 1-9: 1
4. FEELING TIRED OR HAVING LITTLE ENERGY: NOT AT ALL
SUM OF ALL RESPONSES TO PHQ QUESTIONS 1-9: 1
2. FEELING DOWN, DEPRESSED OR HOPELESS: NOT AT ALL
9. THOUGHTS THAT YOU WOULD BE BETTER OFF DEAD, OR OF HURTING YOURSELF: NOT AT ALL
1. LITTLE INTEREST OR PLEASURE IN DOING THINGS: NOT AT ALL
6. FEELING BAD ABOUT YOURSELF - OR THAT YOU ARE A FAILURE OR HAVE LET YOURSELF OR YOUR FAMILY DOWN: NOT AT ALL
10. IF YOU CHECKED OFF ANY PROBLEMS, HOW DIFFICULT HAVE THESE PROBLEMS MADE IT FOR YOU TO DO YOUR WORK, TAKE CARE OF THINGS AT HOME, OR GET ALONG WITH OTHER PEOPLE: NOT DIFFICULT AT ALL
SUM OF ALL RESPONSES TO PHQ QUESTIONS 1-9: 1
3. TROUBLE FALLING OR STAYING ASLEEP: NOT AT ALL
SUM OF ALL RESPONSES TO PHQ QUESTIONS 1-9: 1
5. POOR APPETITE OR OVEREATING: NOT AT ALL

## 2025-07-03 ENCOUNTER — HOSPITAL ENCOUNTER (OUTPATIENT)
Dept: CARDIAC REHAB | Age: 89
Setting detail: THERAPIES SERIES
Discharge: HOME | End: 2025-07-03
Payer: MEDICARE

## 2025-07-03 PROCEDURE — 93798 PHYS/QHP OP CAR RHAB W/ECG: CPT

## 2025-07-03 NOTE — PROGRESS NOTES
07/01/25 1300   Treatment Diagnosis   Treatment Diagnosis 1 PCI   PCI Date 05/09/25   Referral Date 06/09/25   Significant Cardiovascular History Previous myocardial infarction   NYHA Heart Failure Classification   (NA)   Individual Treatment Plan   ITP Visit Type Re-assessment   ITP Next Review Date 07/22/25   Visit #/Total Visits 7/36   EF% 55 %  (Per Note from Heart Cath on 5/19/25)   Risk Stratification Low   Supplemental Oxygen   Oxygen Use No   Exercise Assessment   Stages of Change Preparation   Assisted Devices None   Test Reassessed patient using current exercise prescription   Data Measured Before Test   Heart Rate 61   Resting Blood Pressure 118/68   O2 Device Room air   Data Measured During Test   METS 1.5-2.7   Symptoms   (Pt denies)   Problems While Exercising None   Describe Type of Pain You Are Having NA   Blood Pressure 132/58   O2 Device Room air   Data Measured at Peak   Peak Heart Rate 89   Peak Blood Pressure 132/58   Data Measured at 5 Minutes After Test   Heart Rate 63   Blood Pressure 124/70   O2 Device Room air   Exercise Intervention/Prescription   Mode Stepper;Ergometer;Bike;Treadmill   Frequency per week 2-3x/week   Duration Per Session 36 minutes   Intensity METS       1.5-2.7   Target RPE 9-14   Progression Patient to increase time on each modality by 3-5 minutes as tolerated until next 30 day ITP review.   Symptoms with Exercise Denies   Target Heart Rate   (101-130 (-20%))   Resistance Training No   Exercise Activity at Home   Type Gardening   Exercise Education   Education Exercise safety;Physically active;RPE scale;Self pulse;Signs/symptoms to report;Warm up/cool down;Equipment orientation   Exercise Goals   Patient Target Goals Individual exercise RX;Improve endurance   Patient Treatment Goal Partient to improve overall endurance and stamina by attending 2-3 sessions per week while working towards 60 minutes of aerobic exercise and a 3-5 MET level of intensity.   Goal

## 2025-07-08 ENCOUNTER — HOSPITAL ENCOUNTER (OUTPATIENT)
Dept: CARDIAC REHAB | Age: 89
Setting detail: THERAPIES SERIES
Discharge: HOME | End: 2025-07-08
Payer: MEDICARE

## 2025-07-08 PROCEDURE — 93798 PHYS/QHP OP CAR RHAB W/ECG: CPT

## 2025-07-10 ENCOUNTER — HOSPITAL ENCOUNTER (OUTPATIENT)
Dept: CARDIAC REHAB | Age: 89
Setting detail: THERAPIES SERIES
Discharge: HOME | End: 2025-07-10
Payer: MEDICARE

## 2025-07-10 PROCEDURE — 93798 PHYS/QHP OP CAR RHAB W/ECG: CPT

## 2025-07-17 ENCOUNTER — HOSPITAL ENCOUNTER (OUTPATIENT)
Dept: CARDIAC REHAB | Age: 89
Setting detail: THERAPIES SERIES
Discharge: HOME | End: 2025-07-17
Payer: MEDICARE

## 2025-07-17 PROCEDURE — 93798 PHYS/QHP OP CAR RHAB W/ECG: CPT

## 2025-07-22 ENCOUNTER — HOSPITAL ENCOUNTER (OUTPATIENT)
Dept: CARDIAC REHAB | Age: 89
Setting detail: THERAPIES SERIES
Discharge: HOME | End: 2025-07-22
Payer: MEDICARE

## 2025-07-22 PROCEDURE — 93798 PHYS/QHP OP CAR RHAB W/ECG: CPT

## 2025-07-22 ASSESSMENT — PATIENT HEALTH QUESTIONNAIRE - PHQ9
2. FEELING DOWN, DEPRESSED OR HOPELESS: NOT AT ALL
SUM OF ALL RESPONSES TO PHQ QUESTIONS 1-9: 2
6. FEELING BAD ABOUT YOURSELF - OR THAT YOU ARE A FAILURE OR HAVE LET YOURSELF OR YOUR FAMILY DOWN: NOT AT ALL
4. FEELING TIRED OR HAVING LITTLE ENERGY: SEVERAL DAYS
8. MOVING OR SPEAKING SO SLOWLY THAT OTHER PEOPLE COULD HAVE NOTICED. OR THE OPPOSITE, BEING SO FIGETY OR RESTLESS THAT YOU HAVE BEEN MOVING AROUND A LOT MORE THAN USUAL: NOT AT ALL
3. TROUBLE FALLING OR STAYING ASLEEP: NOT AT ALL
10. IF YOU CHECKED OFF ANY PROBLEMS, HOW DIFFICULT HAVE THESE PROBLEMS MADE IT FOR YOU TO DO YOUR WORK, TAKE CARE OF THINGS AT HOME, OR GET ALONG WITH OTHER PEOPLE: NOT DIFFICULT AT ALL
5. POOR APPETITE OR OVEREATING: NOT AT ALL
SUM OF ALL RESPONSES TO PHQ QUESTIONS 1-9: 2
7. TROUBLE CONCENTRATING ON THINGS, SUCH AS READING THE NEWSPAPER OR WATCHING TELEVISION: SEVERAL DAYS
1. LITTLE INTEREST OR PLEASURE IN DOING THINGS: NOT AT ALL
9. THOUGHTS THAT YOU WOULD BE BETTER OFF DEAD, OR OF HURTING YOURSELF: NOT AT ALL

## 2025-07-22 ASSESSMENT — NEW YORK HEART ASSOCIATION (NYHA) CLASSIFICATION: NYHA FUNCTIONAL CLASS: NO NYHA CLASS OR UNABLE TO DETERMINE

## 2025-07-23 NOTE — PROGRESS NOTES
07/22/25 1300   Treatment Diagnosis   Treatment Diagnosis 1 PCI   PCI Date 05/09/25   Referral Date 06/09/25   Significant Cardiovascular History Previous myocardial infarction   NYHA Heart Failure Classification No NYHA class or unable to determine   Individual Treatment Plan   ITP Visit Type Re-assessment   1st Date of Exercise  06/13/25   ITP Next Review Date 08/12/25   Visit #/Total Visits 12/36   EF% 55 %  (Per Note from Heart Cath on 5/19/25)   Risk Stratification Low   Supplemental Oxygen   Oxygen Use No   Exercise Assessment   Stages of Change Action   Assisted Devices None   Test Reassessed patient using current exercise prescription   Data Measured Before Test   Heart Rate 76   Resting Blood Pressure 124/70   O2 Device Nasal cannula   Data Measured During Test   METS 1.5-2.9   Symptoms   (Patient denies)   Problems While Exercising Patient denies   Describe Type of Pain You Are Having Patient denies   Blood Pressure 132/66   O2 Device Room air   Data Measured at Peak   Peak Heart Rate 105   Peak Blood Pressure 132/66   Data Measured at 5 Minutes After Test   Heart Rate 75   Blood Pressure 118/70   O2 Device Room air   Comments Patient is tolerating ExRx well.   Exercise Intervention/Prescription   Mode Stepper;Ergometer;Bike;Treadmill   Frequency per week 2-3x/week   Duration Per Session 60 minutes   Intensity METS       2-4   Target RPE 9-14   Progression Patient to increase time on each modality by 3-5 minutes as tolerated until next 30 day ITP review.   Symptoms with Exercise Denies   Target Heart Rate   (101-130 (-20%))   Resistance Training No   Exercise Activity at Home   Type Gardening   Exercise Education   Education RPE scale;Physically active;Exercise safety;Signs/symptoms to report;Warm up/cool down   Exercise Goals   Patient Target Goals Individual exercise RX;Improve endurance   Patient Treatment Goal Patient to improve overall endurance and stamina by attending 2-3 sessions per week

## 2025-07-24 ENCOUNTER — HOSPITAL ENCOUNTER (OUTPATIENT)
Dept: CARDIAC REHAB | Age: 89
Setting detail: THERAPIES SERIES
Discharge: HOME | End: 2025-07-24
Payer: MEDICARE

## 2025-07-24 PROCEDURE — 93798 PHYS/QHP OP CAR RHAB W/ECG: CPT

## 2025-07-29 ENCOUNTER — HOSPITAL ENCOUNTER (OUTPATIENT)
Dept: CARDIAC REHAB | Age: 89
Setting detail: THERAPIES SERIES
Discharge: HOME | End: 2025-07-29
Payer: MEDICARE

## 2025-07-29 PROCEDURE — 93798 PHYS/QHP OP CAR RHAB W/ECG: CPT

## 2025-07-31 ENCOUNTER — HOSPITAL ENCOUNTER (OUTPATIENT)
Dept: CARDIAC REHAB | Age: 89
Setting detail: THERAPIES SERIES
Discharge: HOME | End: 2025-07-31
Payer: MEDICARE

## 2025-07-31 PROCEDURE — 93798 PHYS/QHP OP CAR RHAB W/ECG: CPT

## 2025-08-05 ENCOUNTER — HOSPITAL ENCOUNTER (OUTPATIENT)
Dept: CARDIAC REHAB | Age: 89
Setting detail: THERAPIES SERIES
Discharge: HOME | End: 2025-08-05
Payer: MEDICARE

## 2025-08-05 PROCEDURE — 93798 PHYS/QHP OP CAR RHAB W/ECG: CPT

## 2025-08-07 ENCOUNTER — HOSPITAL ENCOUNTER (OUTPATIENT)
Dept: CARDIAC REHAB | Age: 89
Setting detail: THERAPIES SERIES
Discharge: HOME | End: 2025-08-07
Payer: MEDICARE

## 2025-08-07 PROCEDURE — 93798 PHYS/QHP OP CAR RHAB W/ECG: CPT

## 2025-08-12 ENCOUNTER — HOSPITAL ENCOUNTER (OUTPATIENT)
Dept: CARDIAC REHAB | Age: 89
Setting detail: THERAPIES SERIES
Discharge: HOME | End: 2025-08-12
Payer: MEDICARE

## 2025-08-12 PROCEDURE — 93798 PHYS/QHP OP CAR RHAB W/ECG: CPT

## 2025-08-12 ASSESSMENT — PATIENT HEALTH QUESTIONNAIRE - PHQ9
4. FEELING TIRED OR HAVING LITTLE ENERGY: NOT AT ALL
9. THOUGHTS THAT YOU WOULD BE BETTER OFF DEAD, OR OF HURTING YOURSELF: NOT AT ALL
SUM OF ALL RESPONSES TO PHQ QUESTIONS 1-9: 1
2. FEELING DOWN, DEPRESSED OR HOPELESS: NOT AT ALL
SUM OF ALL RESPONSES TO PHQ QUESTIONS 1-9: 1
8. MOVING OR SPEAKING SO SLOWLY THAT OTHER PEOPLE COULD HAVE NOTICED. OR THE OPPOSITE, BEING SO FIGETY OR RESTLESS THAT YOU HAVE BEEN MOVING AROUND A LOT MORE THAN USUAL: NOT AT ALL
3. TROUBLE FALLING OR STAYING ASLEEP: NOT AT ALL
5. POOR APPETITE OR OVEREATING: SEVERAL DAYS
1. LITTLE INTEREST OR PLEASURE IN DOING THINGS: NOT AT ALL
SUM OF ALL RESPONSES TO PHQ QUESTIONS 1-9: 1
SUM OF ALL RESPONSES TO PHQ QUESTIONS 1-9: 1
6. FEELING BAD ABOUT YOURSELF - OR THAT YOU ARE A FAILURE OR HAVE LET YOURSELF OR YOUR FAMILY DOWN: NOT AT ALL
7. TROUBLE CONCENTRATING ON THINGS, SUCH AS READING THE NEWSPAPER OR WATCHING TELEVISION: NOT AT ALL
10. IF YOU CHECKED OFF ANY PROBLEMS, HOW DIFFICULT HAVE THESE PROBLEMS MADE IT FOR YOU TO DO YOUR WORK, TAKE CARE OF THINGS AT HOME, OR GET ALONG WITH OTHER PEOPLE: NOT DIFFICULT AT ALL

## 2025-08-12 ASSESSMENT — NEW YORK HEART ASSOCIATION (NYHA) CLASSIFICATION: NYHA FUNCTIONAL CLASS: NO NYHA CLASS OR UNABLE TO DETERMINE

## 2025-08-14 ENCOUNTER — HOSPITAL ENCOUNTER (OUTPATIENT)
Dept: CARDIAC REHAB | Age: 89
Setting detail: THERAPIES SERIES
Discharge: HOME | End: 2025-08-14
Payer: MEDICARE

## 2025-08-14 PROCEDURE — 93798 PHYS/QHP OP CAR RHAB W/ECG: CPT

## 2025-08-19 ENCOUNTER — APPOINTMENT (OUTPATIENT)
Dept: CARDIAC REHAB | Age: 89
End: 2025-08-19
Payer: MEDICARE

## 2025-08-21 ENCOUNTER — APPOINTMENT (OUTPATIENT)
Dept: CARDIAC REHAB | Age: 89
End: 2025-08-21
Payer: MEDICARE

## 2025-08-26 ENCOUNTER — APPOINTMENT (OUTPATIENT)
Dept: CARDIAC REHAB | Age: 89
End: 2025-08-26
Payer: MEDICARE

## 2025-08-28 ENCOUNTER — APPOINTMENT (OUTPATIENT)
Dept: CARDIAC REHAB | Age: 89
End: 2025-08-28
Payer: MEDICARE

## (undated) DEVICE — CAMERA STRYKER 1488 HD GEN

## (undated) DEVICE — PACK SURG LAP CHOLE CUSTOM

## (undated) DEVICE — SYRINGE MED 10ML TRNSLUC BRL PLUNG BLK MRK POLYPR CTRL

## (undated) DEVICE — APPLICATOR MEDICATED 26 CC SOLUTION HI LT ORNG CHLORAPREP

## (undated) DEVICE — GLOVE ORANGE PI 8   MSG9080

## (undated) DEVICE — KIT HND CTRL AT-XL65

## (undated) DEVICE — RADIFOCUS GLIDEWIRE: Brand: GLIDEWIRE

## (undated) DEVICE — KIT MFLD ISOLATN NACL CNTRST PRT TBNG SPIK W/ PRSS TRNSDUC

## (undated) DEVICE — MICRO-DILATATION CATHETER: Brand: THREADER™

## (undated) DEVICE — TROCAR: Brand: KII SLEEVE

## (undated) DEVICE — GLIDESHEATH SLENDER NITINOL HYDROPHILIC COATED INTRODUCER SHEATH: Brand: GLIDESHEATH SLENDER

## (undated) DEVICE — CANNULA NSL CANN NSL L25FT TBNG AD O2 SUP SFT UC

## (undated) DEVICE — INSUFFLATION NEEDLE TO ESTABLISH PNEUMOPERITONEUM.: Brand: INSUFFLATION NEEDLE

## (undated) DEVICE — [HIGH FLOW INSUFFLATOR,  DO NOT USE IF PACKAGE IS DAMAGED,  KEEP DRY,  KEEP AWAY FROM SUNLIGHT,  PROTECT FROM HEAT AND RADIOACTIVE SOURCES.]: Brand: PNEUMOSURE

## (undated) DEVICE — ELECTRODE PT RET AD L9FT HI MOIST COND ADH HYDRGEL CORDED

## (undated) DEVICE — GUIDEWIRE VASC L260CM DIA0.035IN TIP L3MM STD EXCHG PTFE J

## (undated) DEVICE — Device

## (undated) DEVICE — DOUBLE BASIN SET: Brand: MEDLINE INDUSTRIES, INC.

## (undated) DEVICE — COVER,LIGHT HANDLE,FLX,1/PK: Brand: MEDLINE INDUSTRIES, INC.

## (undated) DEVICE — DRAPE, RADIAL, STERILE: Brand: MEDLINE

## (undated) DEVICE — SET ENDO INSTR LAPAROSCOPIC INCISIONAL

## (undated) DEVICE — PTCA DILATATION CATHETER: Brand: EMERGE™

## (undated) DEVICE — GUIDEWIRE WITH ICE™ HYDROPHILIC COATING: Brand: CHOICE™ PT

## (undated) DEVICE — TR BAND RADIAL ARTERY COMPRESSION DEVICE: Brand: TR BAND

## (undated) DEVICE — GOWN,SIRUS,NONRNF,SETINSLV,XL,20/CS: Brand: MEDLINE

## (undated) DEVICE — GARMENT,MEDLINE,DVT,INT,CALF,MED, GEN2: Brand: MEDLINE

## (undated) DEVICE — DEVICE INFL 20ML 30ATM POLYCARB BRL ABS PLUNG DGT DISPLAY

## (undated) DEVICE — TROCAR: Brand: KII FIOS FIRST ENTRY

## (undated) DEVICE — GLOVE ORANGE PI 7 1/2   MSG9075

## (undated) DEVICE — PAD, DEFIB, ADULT, RADIOTRAN, PHYSIO, LO: Brand: MEDLINE

## (undated) DEVICE — TROCAR ENDOSCP SHFT L150MM DIA8MM TEAL BLDELSS W/ STBL

## (undated) DEVICE — HEARTRAIL III GUIDING CATHETER: Brand: HEARTRAIL

## (undated) DEVICE — TOWEL,OR,DSP,ST,BLUE,STD,6/PK,12PK/CS: Brand: MEDLINE

## (undated) DEVICE — NEEDLE HYPO 25GA L1.5IN BLU POLYPR HUB S STL REG BVL STR

## (undated) DEVICE — LAPAROSCOPIC SCISSORS: Brand: EPIX LAPAROSCOPIC SCISSORS

## (undated) DEVICE — GLOVE SURG SZ 7.5 L11.73IN FNGR THK9.8MIL STRW LTX POLYMER

## (undated) DEVICE — RUNTHROUGH NS EXTRA FLOPPY PTCA GUIDEWIRE: Brand: RUNTHROUGH

## (undated) DEVICE — MARKER,SKIN,WI/RULER AND LABELS: Brand: MEDLINE

## (undated) DEVICE — CATH BLLN ANGIO 2.50X20MM SC EUPHORA RX

## (undated) DEVICE — STAPLER INT DIA5MM 25 ABSRB STRP FIX DISP FOR HERN MESH